# Patient Record
Sex: MALE | Race: WHITE | NOT HISPANIC OR LATINO | Employment: OTHER | URBAN - NONMETROPOLITAN AREA
[De-identification: names, ages, dates, MRNs, and addresses within clinical notes are randomized per-mention and may not be internally consistent; named-entity substitution may affect disease eponyms.]

---

## 2017-05-15 RX ORDER — ATORVASTATIN CALCIUM 10 MG/1
TABLET, FILM COATED ORAL
Qty: 30 TABLET | Refills: 1 | Status: SHIPPED | OUTPATIENT
Start: 2017-05-15 | End: 2018-04-05 | Stop reason: SINTOL

## 2018-04-05 ENCOUNTER — OFFICE VISIT (OUTPATIENT)
Dept: INTERNAL MEDICINE | Facility: CLINIC | Age: 58
End: 2018-04-05

## 2018-04-05 VITALS
TEMPERATURE: 98.1 F | SYSTOLIC BLOOD PRESSURE: 140 MMHG | HEART RATE: 67 BPM | HEIGHT: 70 IN | DIASTOLIC BLOOD PRESSURE: 73 MMHG | RESPIRATION RATE: 16 BRPM | WEIGHT: 158 LBS | OXYGEN SATURATION: 100 % | BODY MASS INDEX: 22.62 KG/M2

## 2018-04-05 DIAGNOSIS — Z82.49 FAMILY HISTORY OF MI (MYOCARDIAL INFARCTION): ICD-10-CM

## 2018-04-05 DIAGNOSIS — Z00.00 ANNUAL PHYSICAL EXAM: Primary | ICD-10-CM

## 2018-04-05 DIAGNOSIS — E78.2 MIXED HYPERLIPIDEMIA: ICD-10-CM

## 2018-04-05 DIAGNOSIS — R00.2 PALPITATIONS: ICD-10-CM

## 2018-04-05 PROCEDURE — 99214 OFFICE O/P EST MOD 30 MIN: CPT | Performed by: PHYSICIAN ASSISTANT

## 2018-04-05 NOTE — PROGRESS NOTES
Subjective     Chief Complaint: establish care, palpitations    HPI:   Saturnino Pereyra is a 58 y.o. male who presents to establish care.  Patient used to see Dr. Vargas.  He is currently on no medications.  Used to take Lipitor for hyperlipidemia, was stopped due to side effects.  Dietary and lifestyle modifications were recommended.    Today he complains of increasing episodes of palpitations.  Patient states he has had palpitations as well as cardiac workup many years ago, but feels episodes are becoming more frequent.  They're causing him anxiety.  He would like to see cardiology.  Denies chest pain, leg swelling, shortness of breath.    He also notes joint aches and pains over the past few months.  Occasionally pain to hips recently he has had pain in shoulders.  Sometimes feels like he has muscle weakness.  Always feeling tired.  No specific injury.  Not due to physical activity.     Patient also complains of a changing mole on lower right arm.  States is becoming more crusty appearing, may be changing in size.  Denies bleeding, pain, itching.  Has not seen dermatology.     History:  The following portions of the patient's history were reviewed and updated as appropriate:    Past Medical History:   Diagnosis Date   • Arthritis        No current outpatient prescriptions on file.    No Known Allergies    Past Surgical History:   Procedure Laterality Date   • BACK SURGERY Bilateral 03/1999   • EYE MUSCLE SURGERY Left 1967   • EYE MUSCLE SURGERY Bilateral 1963       Social History   Substance Use Topics   • Smoking status: Former Smoker     Types: Cigarettes     Quit date: 1980   • Smokeless tobacco: Never Used   • Alcohol use No       Family History   Problem Relation Age of Onset   • Heart disease Mother    • Atrial fibrillation Mother    • Hypotension Mother    • Arthritis Mother    • Heart disease Father        Review of Systems   Constitutional: Positive for fatigue. Negative for appetite change, chills, fever  "and unexpected weight change.   HENT: Negative for congestion, ear pain, hearing loss, nosebleeds, sinus pressure, sore throat, tinnitus and trouble swallowing.    Eyes: Negative for photophobia, discharge and visual disturbance.   Respiratory: Negative for cough, chest tightness, shortness of breath and wheezing.    Cardiovascular: Positive for palpitations. Negative for chest pain and leg swelling.   Gastrointestinal: Negative for abdominal distention, abdominal pain, blood in stool, constipation, diarrhea, nausea and vomiting.   Endocrine: Negative for cold intolerance, heat intolerance, polydipsia, polyphagia and polyuria.   Genitourinary: Negative for difficulty urinating, dysuria, flank pain, frequency, hematuria and urgency.   Musculoskeletal: Positive for arthralgias. Negative for back pain, joint swelling, myalgias, neck pain and neck stiffness.   Skin: Negative for color change, pallor, rash and wound.        Lesion on right wrist.   Allergic/Immunologic: Negative for environmental allergies, food allergies and immunocompromised state.   Neurological: Positive for weakness. Negative for dizziness, numbness and headaches.   Hematological: Negative for adenopathy. Does not bruise/bleed easily.   Psychiatric/Behavioral: Negative for dysphoric mood, hallucinations, sleep disturbance and suicidal ideas. The patient is not nervous/anxious.        Objective      Vitals:    04/05/18 1319   BP: 140/73   BP Location: Left arm   Patient Position: Sitting   Cuff Size: Adult   Pulse: 67   Resp: 16   Temp: 98.1 °F (36.7 °C)   TempSrc: Temporal Artery    SpO2: 100%   Weight: 71.7 kg (158 lb)   Height: 177.8 cm (70\")        Physical Exam   Constitutional: He is oriented to person, place, and time. He appears well-developed and well-nourished.   HENT:   Right Ear: Tympanic membrane normal.   Left Ear: Tympanic membrane normal.   Nose: Nose normal.   Mouth/Throat: Oropharynx is clear and moist.   Eyes: EOM are normal. " Pupils are equal, round, and reactive to light.   Neck: Normal range of motion. Neck supple.   Cardiovascular: Normal rate and regular rhythm.    Pulmonary/Chest: Effort normal and breath sounds normal.   Abdominal: Soft. Bowel sounds are normal. There is no CVA tenderness.   Musculoskeletal: Normal range of motion.   Lymphadenopathy:     He has no cervical adenopathy.   Neurological: He is alert and oriented to person, place, and time.   Skin: Skin is warm and dry.   Lesion appears to be seborrheic keratosis located near right wrist.   Psychiatric: He has a normal mood and affect.        Assessment/Plan     Diagnoses and all orders for this visit:    Annual physical exam  -     CBC (No Diff)  -     Comprehensive Metabolic Panel  -     Lipid Panel  -     Hemoglobin A1c  -     PSA SCREENING  -     Vitamin D 25 hydroxy  -     TSH    Palpitations  -     Ambulatory Referral to Cardiology  -     CBC (No Diff)  -     TSH    Declines EKG today.  Patient states he would like to see cardiology.  May consider Holter monitor.    Family history of MI (myocardial infarction)  -     Ambulatory Referral to Cardiology    Mixed hyperlipidemia  -     Ambulatory Referral to Cardiology  -     Lipid Panel    Discussed healthy diet and exercise, low salt, risks associated with hyperlipidemia.    Return in about 1 month (around 5/5/2018). w/ Dr. Vargas.    Ary Siddiqui PA-C  04/05/2018    Please note that portions of this note were completed with a voice recognition program. Efforts were made to edit dictation, but occasionally words are mistranscribed.

## 2018-04-09 LAB
25(OH)D3+25(OH)D2 SERPL-MCNC: 20.1 NG/ML
ALBUMIN SERPL-MCNC: 4.7 G/DL (ref 3.5–5)
ALBUMIN/GLOB SERPL: 1.4 G/DL (ref 1–2)
ALP SERPL-CCNC: 96 U/L (ref 38–126)
ALT SERPL-CCNC: 40 U/L (ref 13–69)
AST SERPL-CCNC: 30 U/L (ref 15–46)
BILIRUB SERPL-MCNC: 0.7 MG/DL (ref 0.2–1.3)
BUN SERPL-MCNC: 16 MG/DL (ref 7–20)
BUN/CREAT SERPL: 22.9 (ref 6.3–21.9)
CALCIUM SERPL-MCNC: 10.2 MG/DL (ref 8.4–10.2)
CHLORIDE SERPL-SCNC: 102 MMOL/L (ref 98–107)
CHOLEST SERPL-MCNC: 244 MG/DL (ref 0–199)
CO2 SERPL-SCNC: 31 MMOL/L (ref 26–30)
CREAT SERPL-MCNC: 0.7 MG/DL (ref 0.6–1.3)
ERYTHROCYTE [DISTWIDTH] IN BLOOD BY AUTOMATED COUNT: 12.8 % (ref 11.5–14.5)
GFR SERPLBLD CREATININE-BSD FMLA CKD-EPI: 115 ML/MIN/1.73
GFR SERPLBLD CREATININE-BSD FMLA CKD-EPI: 139 ML/MIN/1.73
GLOBULIN SER CALC-MCNC: 3.4 GM/DL
GLUCOSE SERPL-MCNC: 95 MG/DL (ref 74–98)
HBA1C MFR BLD: 5.7 %
HCT VFR BLD AUTO: 45.4 % (ref 42–52)
HDLC SERPL-MCNC: 47 MG/DL (ref 40–60)
HGB BLD-MCNC: 15.3 G/DL (ref 14–18)
LDLC SERPL CALC-MCNC: 155 MG/DL (ref 0–99)
MCH RBC QN AUTO: 32.1 PG (ref 27–31)
MCHC RBC AUTO-ENTMCNC: 33.7 G/DL (ref 30–37)
MCV RBC AUTO: 95.2 FL (ref 80–94)
PLATELET # BLD AUTO: 289 10*3/MM3 (ref 130–400)
POTASSIUM SERPL-SCNC: 4.9 MMOL/L (ref 3.5–5.1)
PROT SERPL-MCNC: 8.1 G/DL (ref 6.3–8.2)
PSA SERPL-MCNC: 2.12 NG/ML (ref 0.06–4)
RBC # BLD AUTO: 4.77 10*6/MM3 (ref 4.7–6.1)
SODIUM SERPL-SCNC: 144 MMOL/L (ref 137–145)
TRIGL SERPL-MCNC: 209 MG/DL
TSH SERPL DL<=0.005 MIU/L-ACNC: 0.84 MIU/ML (ref 0.47–4.68)
VLDLC SERPL CALC-MCNC: 41.8 MG/DL
WBC # BLD AUTO: 6.34 10*3/MM3 (ref 4.8–10.8)

## 2018-04-20 ENCOUNTER — CONSULT (OUTPATIENT)
Dept: CARDIOLOGY | Facility: CLINIC | Age: 58
End: 2018-04-20

## 2018-04-20 VITALS
BODY MASS INDEX: 22.48 KG/M2 | OXYGEN SATURATION: 96 % | WEIGHT: 157 LBS | HEIGHT: 70 IN | HEART RATE: 68 BPM | DIASTOLIC BLOOD PRESSURE: 78 MMHG | RESPIRATION RATE: 16 BRPM | SYSTOLIC BLOOD PRESSURE: 138 MMHG

## 2018-04-20 DIAGNOSIS — R07.2 PRECORDIAL PAIN: Primary | ICD-10-CM

## 2018-04-20 DIAGNOSIS — R94.39 ABNORMAL STRESS ECG WITH TREADMILL: ICD-10-CM

## 2018-04-20 DIAGNOSIS — R00.2 PALPITATIONS: ICD-10-CM

## 2018-04-20 PROCEDURE — 93000 ELECTROCARDIOGRAM COMPLETE: CPT | Performed by: INTERNAL MEDICINE

## 2018-04-20 PROCEDURE — 99203 OFFICE O/P NEW LOW 30 MIN: CPT | Performed by: INTERNAL MEDICINE

## 2018-04-20 NOTE — PROGRESS NOTES
"    Subjective:     Encounter Date:04/20/2018      Patient ID: Saturnino Pereyra is a 58 y.o. male.    Chief Complaint:Chest pain and palpitations  HPI  This is a 58-year-old male patient who reports having chest discomfort for \"all my life\".  The patient indicates for the last 3 weeks he has been experiencing a daily constant chest discomfort.  The patient indicates that the discomfort is diffusely across his upper central chest from just above his nipple line to just below the clavicles.  The discomfort is present continuously from the time he awakens until he goes to sleep at night.  The discomfort is worse with physical activities.  It does not radiate.  It has a 1/10 in intensity.  It is associated with weakness in the large muscle groups such as the buttocks eyes and shoulders.  There is no associated nausea vomiting diaphoresis or shortness of breath.  He has no shortness of breath at rest or with activity.  There is no orthopnea PND or lower extremity edema.  The patient reports having palpitations which have also been present throughout his adult life.  Over the last few weeks these have been worse.  He describes this as a sense that his heart is beating irregularly.  He feels as though there is a skipped beat followed by a very forceful beat.  This became nearly constant 2-3 weeks ago.  It seems to be worse with physical activity.  There is some associated dizziness but no syncope.  He reports generalized weakness and easy fatigue.  He has no history of hypertension or diabetes.  His cholesterol has been mildly elevated in the past but he has been intolerant to multiple statin based cholesterol-lowering therapy's due to myalgias.  He is a nonsmoker.  His family history is negative for premature coronary disease.  The following portions of the patient's history were reviewed and updated as appropriate: allergies, current medications, past family history, past medical history, past social history, past surgical " history and problem  Review of Systems   Constitution: Positive for weakness and malaise/fatigue. Negative for chills, diaphoresis, fever, weight gain and weight loss.   HENT: Negative for ear discharge, hearing loss, hoarse voice and nosebleeds.    Eyes: Negative for discharge, double vision, pain and photophobia.   Cardiovascular: Positive for chest pain, irregular heartbeat and palpitations. Negative for claudication, cyanosis, dyspnea on exertion, leg swelling, near-syncope, orthopnea, paroxysmal nocturnal dyspnea and syncope.   Respiratory: Negative for cough, hemoptysis, shortness of breath, sputum production and wheezing.    Endocrine: Negative for cold intolerance, heat intolerance, polydipsia, polyphagia and polyuria.   Hematologic/Lymphatic: Negative for adenopathy and bleeding problem. Does not bruise/bleed easily.   Skin: Negative for color change, flushing, itching and rash.   Musculoskeletal: Positive for muscle weakness. Negative for muscle cramps, myalgias and stiffness.   Gastrointestinal: Negative for abdominal pain, diarrhea, hematemesis, hematochezia, nausea and vomiting.   Genitourinary: Negative for dysuria, frequency and nocturia.   Neurological: Positive for dizziness. Negative for focal weakness, loss of balance, numbness, paresthesias and seizures.   Psychiatric/Behavioral: Negative for altered mental status, hallucinations and suicidal ideas.   Allergic/Immunologic: Negative for HIV exposure, hives and persistent infections.         No current outpatient prescriptions on file.     Objective:     Physical Exam   Constitutional: He is oriented to person, place, and time. He appears well-developed and well-nourished.   HENT:   Head: Normocephalic and atraumatic.   Mouth/Throat: Oropharynx is clear and moist.   Eyes: Conjunctivae and EOM are normal. Pupils are equal, round, and reactive to light. No scleral icterus.   Neck: Normal range of motion. Neck supple. No JVD present. No tracheal  "deviation present. No thyromegaly present.   Cardiovascular: Normal rate, regular rhythm, S1 normal, S2 normal, normal heart sounds, intact distal pulses and normal pulses.  PMI is not displaced.  Exam reveals no gallop and no friction rub.    No murmur heard.  Pulmonary/Chest: Effort normal and breath sounds normal. No respiratory distress. He has no wheezes. He has no rales.   Abdominal: Soft. Bowel sounds are normal. He exhibits no distension and no mass. There is no tenderness. There is no rebound and no guarding.   Musculoskeletal: Normal range of motion. He exhibits no edema or deformity.   Neurological: He is alert and oriented to person, place, and time. He displays normal reflexes. No cranial nerve deficit. Coordination normal.   Skin: Skin is warm and dry. No rash noted. No erythema.   Psychiatric: He has a normal mood and affect. His behavior is normal. Thought content normal.     Blood pressure 138/78, pulse 68, resp. rate 16, height 177.8 cm (70\"), weight 71.2 kg (157 lb), SpO2 96 %.   Lab Review:       Assessment:         1. Precordial pain  Atypical chest pain.  The patient has little in the way of risk factors for coronary artery disease.  It is been over 10 years since his last ischemic evaluation.  - Treadmill Stress Test  - Adult Transthoracic Echo Complete W/ Cont if Necessary Per Protocol    2. Palpitations  I suspect the patient is experiencing symptomatic PACs\PVCs.  He is never worn an outpatient heart monitor.  - Adult Transthoracic Echo Complete W/ Cont if Necessary Per Protocol  - Holter Monitor - 72 Hour Up To 21 Days      ECG 12 Lead  Date/Time: 4/20/2018 10:30 AM  Performed by: DESIREE DOSS  Authorized by: DESIREE DOSS   Rhythm: sinus rhythm  Rate: normal  QRS axis: normal  Clinical impression: normal ECG             Plan:       I have recommended a treadmill stress test, an echocardiogram and a Holter monitor.  No changes in his medication therapy have been made at today's " visit.  Further recommendations will be predicated on the results of his outpatient testing.

## 2018-05-07 ENCOUNTER — OFFICE VISIT (OUTPATIENT)
Dept: INTERNAL MEDICINE | Facility: CLINIC | Age: 58
End: 2018-05-07

## 2018-05-07 VITALS
HEIGHT: 70 IN | TEMPERATURE: 97.6 F | SYSTOLIC BLOOD PRESSURE: 100 MMHG | OXYGEN SATURATION: 99 % | BODY MASS INDEX: 22.05 KG/M2 | HEART RATE: 64 BPM | WEIGHT: 154 LBS | DIASTOLIC BLOOD PRESSURE: 70 MMHG

## 2018-05-07 DIAGNOSIS — M25.512 CHRONIC PAIN OF BOTH SHOULDERS: ICD-10-CM

## 2018-05-07 DIAGNOSIS — G89.29 CHRONIC PAIN OF BOTH SHOULDERS: ICD-10-CM

## 2018-05-07 DIAGNOSIS — M25.511 CHRONIC PAIN OF BOTH SHOULDERS: ICD-10-CM

## 2018-05-07 DIAGNOSIS — R00.2 PALPITATIONS: Primary | ICD-10-CM

## 2018-05-07 DIAGNOSIS — E78.2 MIXED HYPERLIPIDEMIA: ICD-10-CM

## 2018-05-07 LAB
CK SERPL-CCNC: 72 U/L (ref 30–170)
CRP SERPL-MCNC: <0.5 MG/DL (ref 0–1)

## 2018-05-07 PROCEDURE — 99214 OFFICE O/P EST MOD 30 MIN: CPT | Performed by: INTERNAL MEDICINE

## 2018-05-07 RX ORDER — ROSUVASTATIN CALCIUM 10 MG/1
10 TABLET, COATED ORAL DAILY
Qty: 30 TABLET | Refills: 5 | Status: SHIPPED | OUTPATIENT
Start: 2018-05-07 | End: 2018-11-06 | Stop reason: SDUPTHER

## 2018-05-07 NOTE — PROGRESS NOTES
"Subjective  Saturnino Pereyra is a 58 y.o. male    HPI coming in with complaints of bilateral shoulder pain in multiple joint pains and muscle aches without swelling or rash ongoing for a few months now has had episodes of palpitations for which she has been evaluated by cardiology at that time his EKG was unremarkable however he scheduled for a Holter monitoring with echocardiogram and stress test. Has had a history of dyslipidemia did not tolerate statins well in the past with severe myalgias    The following portions of the patient's history were reviewed and updated as appropriate: allergies, current medications, past family history, past medical history, past social history, past surgical history, and problem list.     Review of Systems   Constitutional: Negative.  Negative for activity change, appetite change, fatigue and fever.   HENT: Negative for congestion, ear discharge, ear pain and trouble swallowing.    Eyes: Negative for photophobia and visual disturbance.   Respiratory: Negative for cough and shortness of breath.    Cardiovascular: Negative for chest pain and palpitations.   Gastrointestinal: Negative for abdominal distention, abdominal pain, constipation, diarrhea, nausea and vomiting.   Endocrine: Negative.    Genitourinary: Negative for dysuria, hematuria and urgency.   Musculoskeletal: Positive for arthralgias and myalgias. Negative for back pain and joint swelling.   Skin: Negative for color change and rash.   Allergic/Immunologic: Negative.    Neurological: Negative for dizziness, weakness, light-headedness and headaches.   Hematological: Negative for adenopathy. Does not bruise/bleed easily.   Psychiatric/Behavioral: Negative for agitation, confusion and dysphoric mood. The patient is not nervous/anxious.        Visit Vitals  /70   Pulse 64   Temp 97.6 °F (36.4 °C)   Ht 177.8 cm (70\")   Wt 69.9 kg (154 lb)   SpO2 99%   BMI 22.10 kg/m²       Objective  Physical Exam   Constitutional: He is " oriented to person, place, and time. He appears well-developed and well-nourished. No distress.   HENT:   Nose: Nose normal.   Mouth/Throat: Oropharynx is clear and moist.   Eyes: Conjunctivae and EOM are normal. No scleral icterus.   Neck: No tracheal deviation present. No thyromegaly present.   Cardiovascular: Normal rate and regular rhythm.  Exam reveals no friction rub.    No murmur heard.  Pulmonary/Chest: No respiratory distress. He has no wheezes. He has no rales.   Abdominal: Soft. He exhibits no distension and no mass. There is no tenderness. There is no guarding.   Musculoskeletal: Normal range of motion. He exhibits no deformity.   Lymphadenopathy:     He has no cervical adenopathy.   Neurological: He is alert and oriented to person, place, and time. He has normal reflexes. No cranial nerve deficit. Coordination normal.   Skin: Skin is warm and dry. No rash noted. No erythema.   Psychiatric: He has a normal mood and affect. His behavior is normal. Judgment and thought content normal.       Diagnoses and all orders for this visit:    Palpitations currently exam unremarkable. Prior EKG reviewed unremarkable. Awaiting further workup denies chest pain or shortness of breath    Mixed hyperlipidemia. Elevated lipids noted from prior lab work through his work start Crestor at 10 mg daily to see if he can tolerate this better discussed dietary restrictions    Chronic pain of both shoulders with impingement syndrome demonstrated exercises with thera band. Referral to physical therapy if not better

## 2018-05-14 PROBLEM — R94.39 ABNORMAL STRESS ECG WITH TREADMILL: Status: ACTIVE | Noted: 2018-05-14

## 2018-05-14 LAB
BH CV ECHO MEAS - % IVS THICK: 66.7 %
BH CV ECHO MEAS - % LVPW THICK: 12.5 %
BH CV ECHO MEAS - AO MAX PG (FULL): 0.29 MMHG
BH CV ECHO MEAS - AO MAX PG: 4 MMHG
BH CV ECHO MEAS - AO MEAN PG (FULL): 0 MMHG
BH CV ECHO MEAS - AO MEAN PG: 2 MMHG
BH CV ECHO MEAS - AO ROOT AREA: 7.5 CM^2
BH CV ECHO MEAS - AO ROOT DIAM: 3.1 CM
BH CV ECHO MEAS - AO V2 MAX: 96.8 CM/SEC
BH CV ECHO MEAS - AO V2 MEAN: 65.2 CM/SEC
BH CV ECHO MEAS - AO V2 VTI: 16.7 CM
BH CV ECHO MEAS - AVA(I,A): 3.8 CM^2
BH CV ECHO MEAS - AVA(I,D): 3.8 CM^2
BH CV ECHO MEAS - AVA(V,A): 3.1 CM^2
BH CV ECHO MEAS - AVA(V,D): 3.1 CM^2
BH CV ECHO MEAS - CONTRAST EF 4CH: 66.4 ML/M^2
BH CV ECHO MEAS - EDV(CUBED): 117.6 ML
BH CV ECHO MEAS - EDV(MOD-SP4): 110 ML
BH CV ECHO MEAS - EDV(TEICH): 112.8 ML
BH CV ECHO MEAS - EF(CUBED): 69.5 %
BH CV ECHO MEAS - EF(MOD-SP4): 66.4 %
BH CV ECHO MEAS - EF(TEICH): 60.9 %
BH CV ECHO MEAS - ESV(CUBED): 35.9 ML
BH CV ECHO MEAS - ESV(MOD-SP4): 37 ML
BH CV ECHO MEAS - ESV(TEICH): 44.1 ML
BH CV ECHO MEAS - FS: 32.7 %
BH CV ECHO MEAS - IVS/LVPW: 1.1
BH CV ECHO MEAS - IVSD: 0.9 CM
BH CV ECHO MEAS - IVSS: 1.5 CM
BH CV ECHO MEAS - LA DIMENSION: 3.1 CM
BH CV ECHO MEAS - LA/AO: 1
BH CV ECHO MEAS - LV IVRT: 0.12 SEC
BH CV ECHO MEAS - LV MASS(C)D: 141.9 GRAMS
BH CV ECHO MEAS - LV MASS(C)S: 124.8 GRAMS
BH CV ECHO MEAS - LV MAX PG: 3.7 MMHG
BH CV ECHO MEAS - LV MEAN PG: 2 MMHG
BH CV ECHO MEAS - LV V1 MAX: 96.4 CM/SEC
BH CV ECHO MEAS - LV V1 MEAN: 66 CM/SEC
BH CV ECHO MEAS - LV V1 VTI: 20.2 CM
BH CV ECHO MEAS - LVIDD: 4.9 CM
BH CV ECHO MEAS - LVIDS: 3.3 CM
BH CV ECHO MEAS - LVLD AP4: 7.5 CM
BH CV ECHO MEAS - LVLS AP4: 6.1 CM
BH CV ECHO MEAS - LVOT AREA (M): 3.1 CM^2
BH CV ECHO MEAS - LVOT AREA: 3.1 CM^2
BH CV ECHO MEAS - LVOT DIAM: 2 CM
BH CV ECHO MEAS - LVPWD: 0.8 CM
BH CV ECHO MEAS - LVPWS: 0.9 CM
BH CV ECHO MEAS - MV A MAX VEL: 61.7 CM/SEC
BH CV ECHO MEAS - MV DEC SLOPE: 377 CM/SEC^2
BH CV ECHO MEAS - MV DEC TIME: 0.27 SEC
BH CV ECHO MEAS - MV E MAX VEL: 98.7 CM/SEC
BH CV ECHO MEAS - MV E/A: 1.6
BH CV ECHO MEAS - MV P1/2T MAX VEL: 96.7 CM/SEC
BH CV ECHO MEAS - MV P1/2T: 75.1 MSEC
BH CV ECHO MEAS - MVA P1/2T LCG: 2.3 CM^2
BH CV ECHO MEAS - MVA(P1/2T): 2.9 CM^2
BH CV ECHO MEAS - PA MAX PG: 3.8 MMHG
BH CV ECHO MEAS - PA V2 MAX: 97.2 CM/SEC
BH CV ECHO MEAS - RAP SYSTOLE: 10 MMHG
BH CV ECHO MEAS - RVSP: 21 MMHG
BH CV ECHO MEAS - SV(AO): 126 ML
BH CV ECHO MEAS - SV(CUBED): 81.7 ML
BH CV ECHO MEAS - SV(LVOT): 63.5 ML
BH CV ECHO MEAS - SV(MOD-SP4): 73 ML
BH CV ECHO MEAS - SV(TEICH): 68.7 ML
BH CV ECHO MEAS - TR MAX VEL: 168 CM/SEC
BH CV ECHO MEAS - TV MAX PG: 0.79 MMHG
BH CV ECHO MEAS - TV V2 MAX: 44.4 CM/SEC
BH CV STRESS DURATION MIN STAGE 1: 3
BH CV STRESS DURATION SEC STAGE 1: 0
BH CV STRESS GRADE STAGE 1: 10
BH CV STRESS METS STAGE 1: 5
BH CV STRESS PROTOCOL 1: ABNORMAL
BH CV STRESS RECOVERY BP: ABNORMAL MMHG
BH CV STRESS RECOVERY HR: 81 BPM
BH CV STRESS RECOVERY O2: 99 %
BH CV STRESS SPEED STAGE 1: 1.7
BH CV STRESS STAGE 1: 1
LV EF 2D ECHO EST: 60 %
MAXIMAL PREDICTED HEART RATE: 160 BPM
PERCENT MAX PREDICTED HR: 103.75 %
STRESS BASELINE BP: ABNORMAL MMHG
STRESS BASELINE HR: 66 BPM
STRESS O2 SAT REST: 100 %
STRESS PERCENT HR: 122 %
STRESS POST ESTIMATED WORKLOAD: 10.1 METS
STRESS POST EXERCISE DUR MIN: 9 MIN
STRESS POST O2 SAT PEAK: 98 %
STRESS POST PEAK BP: ABNORMAL MMHG
STRESS POST PEAK HR: 166 BPM
STRESS TARGET HR: 136 BPM

## 2018-05-16 ENCOUNTER — HOSPITAL ENCOUNTER (OUTPATIENT)
Facility: HOSPITAL | Age: 58
Setting detail: HOSPITAL OUTPATIENT SURGERY
Discharge: HOME | End: 2018-05-16
Attending: INTERNAL MEDICINE | Admitting: INTERNAL MEDICINE

## 2018-05-16 VITALS
TEMPERATURE: 97.2 F | SYSTOLIC BLOOD PRESSURE: 103 MMHG | OXYGEN SATURATION: 100 % | RESPIRATION RATE: 16 BRPM | HEART RATE: 54 BPM | BODY MASS INDEX: 21.62 KG/M2 | HEIGHT: 70 IN | DIASTOLIC BLOOD PRESSURE: 61 MMHG | WEIGHT: 151 LBS

## 2018-05-16 DIAGNOSIS — R94.39 ABNORMAL STRESS ECG WITH TREADMILL: ICD-10-CM

## 2018-05-16 DIAGNOSIS — R07.2 PRECORDIAL PAIN: ICD-10-CM

## 2018-05-16 LAB
ANION GAP SERPL CALCULATED.3IONS-SCNC: 14.9 MMOL/L (ref 10–20)
APTT PPP: 32.8 SECONDS (ref 25–36)
BASOPHILS # BLD AUTO: 0.04 10*3/MM3 (ref 0–0.2)
BASOPHILS NFR BLD AUTO: 0.6 % (ref 0–2.5)
BUN BLD-MCNC: 14 MG/DL (ref 7–20)
BUN/CREAT SERPL: 20 (ref 6.3–21.9)
CALCIUM SPEC-SCNC: 9.5 MG/DL (ref 8.4–10.2)
CHLORIDE SERPL-SCNC: 104 MMOL/L (ref 98–107)
CO2 SERPL-SCNC: 29 MMOL/L (ref 26–30)
CREAT BLD-MCNC: 0.7 MG/DL (ref 0.6–1.3)
DEPRECATED RDW RBC AUTO: 43.5 FL (ref 37–54)
EOSINOPHIL # BLD AUTO: 0.06 10*3/MM3 (ref 0–0.7)
EOSINOPHIL NFR BLD AUTO: 1 % (ref 0–7)
ERYTHROCYTE [DISTWIDTH] IN BLOOD BY AUTOMATED COUNT: 12.4 % (ref 11.5–14.5)
GFR SERPL CREATININE-BSD FRML MDRD: 115 ML/MIN/1.73
GLUCOSE BLD-MCNC: 100 MG/DL (ref 74–98)
HCT VFR BLD AUTO: 41.1 % (ref 42–52)
HGB BLD-MCNC: 14.3 G/DL (ref 14–18)
IMM GRANULOCYTES # BLD: 0.02 10*3/MM3 (ref 0–0.06)
IMM GRANULOCYTES NFR BLD: 0.3 % (ref 0–0.6)
INR PPP: 1.08 (ref 0.9–1.1)
LYMPHOCYTES # BLD AUTO: 2.08 10*3/MM3 (ref 0.6–3.4)
LYMPHOCYTES NFR BLD AUTO: 33.2 % (ref 10–50)
MCH RBC QN AUTO: 32.6 PG (ref 27–31)
MCHC RBC AUTO-ENTMCNC: 34.8 G/DL (ref 30–37)
MCV RBC AUTO: 93.8 FL (ref 80–94)
MONOCYTES # BLD AUTO: 0.65 10*3/MM3 (ref 0–0.9)
MONOCYTES NFR BLD AUTO: 10.4 % (ref 0–12)
NEUTROPHILS # BLD AUTO: 3.42 10*3/MM3 (ref 2–6.9)
NEUTROPHILS NFR BLD AUTO: 54.5 % (ref 37–80)
NRBC BLD MANUAL-RTO: 0 /100 WBC (ref 0–0)
PLATELET # BLD AUTO: 247 10*3/MM3 (ref 130–400)
PMV BLD AUTO: 10.1 FL (ref 6–12)
POTASSIUM BLD-SCNC: 3.9 MMOL/L (ref 3.5–5.1)
PROTHROMBIN TIME: 12.1 SECONDS (ref 9.3–12.1)
RBC # BLD AUTO: 4.38 10*6/MM3 (ref 4.7–6.1)
SODIUM BLD-SCNC: 144 MMOL/L (ref 137–145)
WBC NRBC COR # BLD: 6.27 10*3/MM3 (ref 4.8–10.8)

## 2018-05-16 PROCEDURE — 85730 THROMBOPLASTIN TIME PARTIAL: CPT | Performed by: INTERNAL MEDICINE

## 2018-05-16 PROCEDURE — C1769 GUIDE WIRE: HCPCS | Performed by: INTERNAL MEDICINE

## 2018-05-16 PROCEDURE — 25010000002 MIDAZOLAM PER 1 MG: Performed by: INTERNAL MEDICINE

## 2018-05-16 PROCEDURE — 93454 CORONARY ARTERY ANGIO S&I: CPT | Performed by: INTERNAL MEDICINE

## 2018-05-16 PROCEDURE — 85610 PROTHROMBIN TIME: CPT | Performed by: INTERNAL MEDICINE

## 2018-05-16 PROCEDURE — 25010000002 HEPARIN (PORCINE) PER 1000 UNITS: Performed by: INTERNAL MEDICINE

## 2018-05-16 PROCEDURE — 25010000002 FENTANYL CITRATE (PF) 100 MCG/2ML SOLUTION: Performed by: INTERNAL MEDICINE

## 2018-05-16 PROCEDURE — 85025 COMPLETE CBC W/AUTO DIFF WBC: CPT | Performed by: INTERNAL MEDICINE

## 2018-05-16 PROCEDURE — C1894 INTRO/SHEATH, NON-LASER: HCPCS | Performed by: INTERNAL MEDICINE

## 2018-05-16 PROCEDURE — 93005 ELECTROCARDIOGRAM TRACING: CPT | Performed by: INTERNAL MEDICINE

## 2018-05-16 PROCEDURE — 80048 BASIC METABOLIC PNL TOTAL CA: CPT | Performed by: INTERNAL MEDICINE

## 2018-05-16 PROCEDURE — 0 IOPAMIDOL PER 1 ML: Performed by: INTERNAL MEDICINE

## 2018-05-16 RX ORDER — MIDAZOLAM HYDROCHLORIDE 1 MG/ML
INJECTION INTRAMUSCULAR; INTRAVENOUS AS NEEDED
Status: DISCONTINUED | OUTPATIENT
Start: 2018-05-16 | End: 2018-05-16 | Stop reason: HOSPADM

## 2018-05-16 RX ORDER — SODIUM CHLORIDE 9 MG/ML
100 INJECTION, SOLUTION INTRAVENOUS CONTINUOUS
Status: DISCONTINUED | OUTPATIENT
Start: 2018-05-16 | End: 2018-05-16 | Stop reason: HOSPADM

## 2018-05-16 RX ORDER — LIDOCAINE HYDROCHLORIDE 10 MG/ML
INJECTION, SOLUTION INFILTRATION; PERINEURAL AS NEEDED
Status: DISCONTINUED | OUTPATIENT
Start: 2018-05-16 | End: 2018-05-16 | Stop reason: HOSPADM

## 2018-05-16 RX ORDER — FENTANYL CITRATE 50 UG/ML
INJECTION, SOLUTION INTRAMUSCULAR; INTRAVENOUS AS NEEDED
Status: DISCONTINUED | OUTPATIENT
Start: 2018-05-16 | End: 2018-05-16 | Stop reason: HOSPADM

## 2018-05-16 NOTE — INTERVAL H&P NOTE
H&P reviewed. The patient was examined and there are no changes to the H&P.      Addendum  Physical examination  Ear  Nose and throat: Normal gag reflex  Neck: Normal central venous pressure  Peripheral pulses: Left and right radial pulses are present with normal Harjinder testing  Lungs: Clear to auscultation  Heart: Regular rate and rhythm.  Normal S1.  Normal S2.  No murmurs rubs or gallops.

## 2018-06-04 ENCOUNTER — OFFICE VISIT (OUTPATIENT)
Dept: CARDIOLOGY | Facility: CLINIC | Age: 58
End: 2018-06-04

## 2018-06-04 VITALS
HEART RATE: 78 BPM | OXYGEN SATURATION: 98 % | WEIGHT: 151 LBS | BODY MASS INDEX: 21.62 KG/M2 | SYSTOLIC BLOOD PRESSURE: 118 MMHG | RESPIRATION RATE: 18 BRPM | DIASTOLIC BLOOD PRESSURE: 70 MMHG | HEIGHT: 70 IN

## 2018-06-04 DIAGNOSIS — R00.2 PALPITATIONS: ICD-10-CM

## 2018-06-04 DIAGNOSIS — R07.2 PRECORDIAL PAIN: Primary | ICD-10-CM

## 2018-06-04 PROCEDURE — 99213 OFFICE O/P EST LOW 20 MIN: CPT | Performed by: INTERNAL MEDICINE

## 2018-06-04 NOTE — PROGRESS NOTES
"    Subjective:     Encounter Date:06/04/2018      Patient ID: Saturnino Pereyra is a 58 y.o. male.    Chief Complaint:Palpitations  HPI  This is a 58-year-old male patient who recently underwent a battery of outpatient testing.  The patient had an outpatient cardiac catheterization which disclosed angiographically near normal coronary arteries.  The patient had very minor plaque accumulation in a branch of an obtuse marginal branch.  His main coronary arteries including the left main, left anterior descending, circumflex and right coronary arteries had no significant disease.  The patient also underwent an echocardiogram which showed normal cardiac chamber dimensions and muscle thickness.  The ejection fraction was normal and there were no regional wall motion abnormalities.  The patient demonstrated mild mitral regurgitation of no clinical significance.  There was no evidence of significant valvular pathology, pulmonary hypertension, intracardiac shunting or pericardial disease.  Both systolic and diastolic function was normal.  The patient also had a seven-day cardiac monitor in which she had 10 symptomatic activations for palpitations over the monitoring phase.  In each and every case the patient was experiencing only normal sinus rhythm.  He experienced no atrial or ventricular ectopy.  The patient's palpitations occurred both while at rest and with activity.  In each case the patient's heart rate was in the normal range from 67 bpm to 100 bpm.  The patient indicates that he uses a stethoscope at night when he is laying in bed and is absolutely sure that his heart was \"skipping beats\" during some of these episodes.  The patient indicates that he has been experiencing palpitations now for over 20 years but they are much worse in frequency duration and intensity.  The patient indicates that on the suggestion of his primary care provider he initiated over-the-counter proton pump inhibitor therapy and after 3 doses his " "chest discomfort completely resolved.  The patient is concerned that his chest discomfort would be relieved with antacid therapy.  I have explained to the patient that esophageal discomfort is very commonly mistaken for cardiac abnormalities.  This is in essence the rosaline of the symptom main \"heartburn\".  I have also explained to the patient that it is common for other patients who have both significant cardiac disease as well as esophageal disorders that they cannot distinguish their symptoms.  The patient is encouraged that the resolution of his chest discomfort with antacid therapy is a positive feature.  The patient is however concerned that the antacid therapy has not improved his palpitations.  I do not feel there is a connection between his esophageal pathology and his palpitations.  I have reassured the patient that there is no evidence of arrhythmia or frequent\complex atrial or ventricular ectopy.  More importantly.  There is absolutely no correlation between his palpitations and any documented arrhythmia or ectopy.  I have no explanation for the etiology of his symptom of palpitations.  The following portions of the patient's history were reviewed and updated as appropriate: allergies, current medications, past family history, past medical history, past social history, past surgical history and problem  Review of Systems   Constitution: Negative for chills, diaphoresis, fever, weakness, malaise/fatigue, night sweats, weight gain and weight loss.   HENT: Negative for ear discharge, hearing loss, hoarse voice and nosebleeds.    Eyes: Negative for discharge, double vision, pain and photophobia.   Cardiovascular: Positive for irregular heartbeat and palpitations. Negative for chest pain, claudication, cyanosis, dyspnea on exertion, leg swelling, near-syncope, orthopnea, paroxysmal nocturnal dyspnea and syncope.   Respiratory: Negative for cough, hemoptysis, shortness of breath, sputum production and " wheezing.    Endocrine: Negative for cold intolerance, heat intolerance, polydipsia, polyphagia and polyuria.   Hematologic/Lymphatic: Negative for adenopathy and bleeding problem. Does not bruise/bleed easily.   Skin: Negative for color change, flushing, itching and rash.   Musculoskeletal: Negative for muscle cramps, muscle weakness, myalgias and stiffness.   Gastrointestinal: Negative for abdominal pain, diarrhea, hematemesis, hematochezia, nausea and vomiting.   Genitourinary: Negative for dysuria, frequency and nocturia.   Neurological: Negative for focal weakness, loss of balance, numbness, paresthesias and seizures.   Psychiatric/Behavioral: Negative for altered mental status, hallucinations and suicidal ideas.   Allergic/Immunologic: Negative for HIV exposure, hives and persistent infections.           Current Outpatient Prescriptions:   •  rosuvastatin (CRESTOR) 10 MG tablet, Take 1 tablet by mouth Daily., Disp: 30 tablet, Rfl: 5     Objective:     Physical Exam   Constitutional: He is oriented to person, place, and time. He appears well-developed and well-nourished.   HENT:   Head: Normocephalic and atraumatic.   Mouth/Throat: Oropharynx is clear and moist.   Eyes: Conjunctivae and EOM are normal. Pupils are equal, round, and reactive to light. No scleral icterus.   Neck: Normal range of motion. Neck supple. No JVD present. No tracheal deviation present. No thyromegaly present.   Cardiovascular: Normal rate, regular rhythm, S1 normal, S2 normal, normal heart sounds, intact distal pulses and normal pulses.  PMI is not displaced.  Exam reveals no gallop and no friction rub.    No murmur heard.  Pulmonary/Chest: Effort normal and breath sounds normal. No respiratory distress. He has no wheezes. He has no rales.   Abdominal: Soft. Bowel sounds are normal. He exhibits no distension and no mass. There is no tenderness. There is no rebound and no guarding.   Musculoskeletal: Normal range of motion. He exhibits no  "edema or deformity.   Neurological: He is alert and oriented to person, place, and time. He displays normal reflexes. No cranial nerve deficit. Coordination normal.   Skin: Skin is warm and dry. No rash noted. No erythema.   Psychiatric: He has a normal mood and affect. His behavior is normal. Thought content normal.     Blood pressure 118/70, pulse 78, resp. rate 18, height 177.8 cm (70\"), weight 68.5 kg (151 lb), SpO2 98 %.   Lab Review:       Assessment:         1. Precordial pain  Noncardiac chest pain.  In retrospect it would appear that the patient is experiencing esophageal chest discomfort.  Patient indicates that his chest discomfort has completely resolved after 3 doses of over-the-counter Prilosec.    2. Palpitations  The patient continues to have palpitations.  He reports this as a sense that his heart has \"skipped a beat\".  The patient has worn a seven-day cardiac monitor.  During 7 days the patient experienced 10 diary entries all 4 palpitations.  In each and every case the patient experienced normal rhythm with no evidence of arrhythmia atrial or ventricular ectopy.    Procedures     Plan:       No additional cardiovascular testing is indicated from my standpoint.  No changes to his medication therapy are indicated.  The patient indicates that he is disappointed in the negative results of his cardiac testing.  I have reassured the patient regarding the benign nature of his cardiac findings.  The patient indicates that he has been dealing with palpitations for over 20 years and he indicates that he is convinced that there is \"something going on\".  I have encouraged the patient regarding his benign test results.  I have indicated that the patient should take great encouragement from the results of this testing.  I have offered the patient the option of a second opinion from a cardiologist in [facility].  The patient indicates that he will discuss this further with his primary care " provider.

## 2018-06-06 ENCOUNTER — HOSPITAL ENCOUNTER (EMERGENCY)
Facility: HOSPITAL | Age: 58
Discharge: HOME | End: 2018-06-06
Attending: EMERGENCY MEDICINE | Admitting: EMERGENCY MEDICINE

## 2018-06-06 ENCOUNTER — APPOINTMENT (OUTPATIENT)
Dept: CT IMAGING | Facility: HOSPITAL | Age: 58
End: 2018-06-06

## 2018-06-06 VITALS
DIASTOLIC BLOOD PRESSURE: 76 MMHG | OXYGEN SATURATION: 98 % | HEIGHT: 70 IN | TEMPERATURE: 98.5 F | RESPIRATION RATE: 18 BRPM | BODY MASS INDEX: 21.79 KG/M2 | HEART RATE: 61 BPM | SYSTOLIC BLOOD PRESSURE: 139 MMHG | WEIGHT: 152.2 LBS

## 2018-06-06 DIAGNOSIS — N20.1 RIGHT URETERAL STONE: Primary | ICD-10-CM

## 2018-06-06 LAB
ALBUMIN SERPL-MCNC: 4.8 G/DL (ref 3.5–5)
ALBUMIN/GLOB SERPL: 1.4 G/DL (ref 1–2)
ALP SERPL-CCNC: 94 U/L (ref 38–126)
ALT SERPL W P-5'-P-CCNC: 32 U/L (ref 13–69)
ANION GAP SERPL CALCULATED.3IONS-SCNC: 17.4 MMOL/L (ref 10–20)
AST SERPL-CCNC: 32 U/L (ref 15–46)
BACTERIA UR QL AUTO: ABNORMAL /HPF
BASOPHILS # BLD AUTO: 0.03 10*3/MM3 (ref 0–0.2)
BASOPHILS NFR BLD AUTO: 0.2 % (ref 0–2.5)
BILIRUB SERPL-MCNC: 0.5 MG/DL (ref 0.2–1.3)
BILIRUB UR QL STRIP: NEGATIVE
BUN BLD-MCNC: 16 MG/DL (ref 7–20)
BUN/CREAT SERPL: 20 (ref 6.3–21.9)
CALCIUM SPEC-SCNC: 10.1 MG/DL (ref 8.4–10.2)
CHLORIDE SERPL-SCNC: 103 MMOL/L (ref 98–107)
CLARITY UR: CLEAR
CO2 SERPL-SCNC: 27 MMOL/L (ref 26–30)
COLOR UR: YELLOW
CREAT BLD-MCNC: 0.8 MG/DL (ref 0.6–1.3)
D-LACTATE SERPL-SCNC: 1.2 MMOL/L (ref 0.5–2)
DEPRECATED RDW RBC AUTO: 43 FL (ref 37–54)
EOSINOPHIL # BLD AUTO: 0.01 10*3/MM3 (ref 0–0.7)
EOSINOPHIL NFR BLD AUTO: 0.1 % (ref 0–7)
ERYTHROCYTE [DISTWIDTH] IN BLOOD BY AUTOMATED COUNT: 12.4 % (ref 11.5–14.5)
GFR SERPL CREATININE-BSD FRML MDRD: 99 ML/MIN/1.73
GLOBULIN UR ELPH-MCNC: 3.4 GM/DL
GLUCOSE BLD-MCNC: 117 MG/DL (ref 74–98)
GLUCOSE UR STRIP-MCNC: NEGATIVE MG/DL
HCT VFR BLD AUTO: 41.7 % (ref 42–52)
HGB BLD-MCNC: 14.2 G/DL (ref 14–18)
HGB UR QL STRIP.AUTO: ABNORMAL
HOLD SPECIMEN: NORMAL
HOLD SPECIMEN: NORMAL
HYALINE CASTS UR QL AUTO: ABNORMAL /LPF
IMM GRANULOCYTES # BLD: 0.04 10*3/MM3 (ref 0–0.06)
IMM GRANULOCYTES NFR BLD: 0.3 % (ref 0–0.6)
KETONES UR QL STRIP: NEGATIVE
LEUKOCYTE ESTERASE UR QL STRIP.AUTO: NEGATIVE
LIPASE SERPL-CCNC: 88 U/L (ref 23–300)
LYMPHOCYTES # BLD AUTO: 1.37 10*3/MM3 (ref 0.6–3.4)
LYMPHOCYTES NFR BLD AUTO: 10.4 % (ref 10–50)
MCH RBC QN AUTO: 32.1 PG (ref 27–31)
MCHC RBC AUTO-ENTMCNC: 34.1 G/DL (ref 30–37)
MCV RBC AUTO: 94.3 FL (ref 80–94)
MONOCYTES # BLD AUTO: 0.85 10*3/MM3 (ref 0–0.9)
MONOCYTES NFR BLD AUTO: 6.4 % (ref 0–12)
NEUTROPHILS # BLD AUTO: 10.91 10*3/MM3 (ref 2–6.9)
NEUTROPHILS NFR BLD AUTO: 82.6 % (ref 37–80)
NITRITE UR QL STRIP: NEGATIVE
NRBC BLD MANUAL-RTO: 0 /100 WBC (ref 0–0)
PH UR STRIP.AUTO: 8.5 [PH] (ref 5–8)
PLATELET # BLD AUTO: 274 10*3/MM3 (ref 130–400)
PMV BLD AUTO: 10.6 FL (ref 6–12)
POTASSIUM BLD-SCNC: 4.4 MMOL/L (ref 3.5–5.1)
PROT SERPL-MCNC: 8.2 G/DL (ref 6.3–8.2)
PROT UR QL STRIP: NEGATIVE
RBC # BLD AUTO: 4.42 10*6/MM3 (ref 4.7–6.1)
RBC # UR: ABNORMAL /HPF
REF LAB TEST METHOD: ABNORMAL
SODIUM BLD-SCNC: 143 MMOL/L (ref 137–145)
SP GR UR STRIP: 1.01 (ref 1–1.03)
SQUAMOUS #/AREA URNS HPF: ABNORMAL /HPF
UROBILINOGEN UR QL STRIP: ABNORMAL
WBC NRBC COR # BLD: 13.21 10*3/MM3 (ref 4.8–10.8)
WBC UR QL AUTO: ABNORMAL /HPF
WHOLE BLOOD HOLD SPECIMEN: NORMAL
WHOLE BLOOD HOLD SPECIMEN: NORMAL

## 2018-06-06 PROCEDURE — 96374 THER/PROPH/DIAG INJ IV PUSH: CPT

## 2018-06-06 PROCEDURE — 83605 ASSAY OF LACTIC ACID: CPT | Performed by: EMERGENCY MEDICINE

## 2018-06-06 PROCEDURE — 25010000002 ONDANSETRON PER 1 MG: Performed by: EMERGENCY MEDICINE

## 2018-06-06 PROCEDURE — 25010000002 MORPHINE PER 10 MG: Performed by: EMERGENCY MEDICINE

## 2018-06-06 PROCEDURE — 83690 ASSAY OF LIPASE: CPT | Performed by: EMERGENCY MEDICINE

## 2018-06-06 PROCEDURE — 96375 TX/PRO/DX INJ NEW DRUG ADDON: CPT

## 2018-06-06 PROCEDURE — 80053 COMPREHEN METABOLIC PANEL: CPT | Performed by: EMERGENCY MEDICINE

## 2018-06-06 PROCEDURE — 99284 EMERGENCY DEPT VISIT MOD MDM: CPT

## 2018-06-06 PROCEDURE — 74176 CT ABD & PELVIS W/O CONTRAST: CPT

## 2018-06-06 PROCEDURE — 85025 COMPLETE CBC W/AUTO DIFF WBC: CPT | Performed by: EMERGENCY MEDICINE

## 2018-06-06 PROCEDURE — 81001 URINALYSIS AUTO W/SCOPE: CPT | Performed by: EMERGENCY MEDICINE

## 2018-06-06 PROCEDURE — 25010000002 KETOROLAC TROMETHAMINE PER 15 MG: Performed by: EMERGENCY MEDICINE

## 2018-06-06 RX ORDER — ONDANSETRON 2 MG/ML
4 INJECTION INTRAMUSCULAR; INTRAVENOUS ONCE
Status: COMPLETED | OUTPATIENT
Start: 2018-06-06 | End: 2018-06-06

## 2018-06-06 RX ORDER — ONDANSETRON 4 MG/1
4 TABLET, ORALLY DISINTEGRATING ORAL EVERY 8 HOURS PRN
Qty: 8 TABLET | Refills: 0 | Status: SHIPPED | OUTPATIENT
Start: 2018-06-06 | End: 2019-04-11

## 2018-06-06 RX ORDER — SODIUM CHLORIDE 0.9 % (FLUSH) 0.9 %
10 SYRINGE (ML) INJECTION AS NEEDED
Status: DISCONTINUED | OUTPATIENT
Start: 2018-06-06 | End: 2018-06-06 | Stop reason: HOSPADM

## 2018-06-06 RX ORDER — OXYCODONE HYDROCHLORIDE AND ACETAMINOPHEN 5; 325 MG/1; MG/1
1 TABLET ORAL EVERY 6 HOURS PRN
Qty: 12 TABLET | Refills: 0 | Status: SHIPPED | OUTPATIENT
Start: 2018-06-06 | End: 2019-04-11

## 2018-06-06 RX ORDER — TAMSULOSIN HYDROCHLORIDE 0.4 MG/1
1 CAPSULE ORAL NIGHTLY
Qty: 10 CAPSULE | Refills: 0 | Status: SHIPPED | OUTPATIENT
Start: 2018-06-06 | End: 2018-06-18 | Stop reason: SDUPTHER

## 2018-06-06 RX ORDER — MORPHINE SULFATE 2 MG/ML
4 INJECTION, SOLUTION INTRAMUSCULAR; INTRAVENOUS ONCE
Status: COMPLETED | OUTPATIENT
Start: 2018-06-06 | End: 2018-06-06

## 2018-06-06 RX ORDER — KETOROLAC TROMETHAMINE 30 MG/ML
15 INJECTION, SOLUTION INTRAMUSCULAR; INTRAVENOUS ONCE
Status: COMPLETED | OUTPATIENT
Start: 2018-06-06 | End: 2018-06-06

## 2018-06-06 RX ADMIN — Medication 10 ML: at 13:24

## 2018-06-06 RX ADMIN — MORPHINE SULFATE 4 MG: 2 INJECTION, SOLUTION INTRAMUSCULAR; INTRAVENOUS at 13:24

## 2018-06-06 RX ADMIN — ONDANSETRON 4 MG: 2 INJECTION, SOLUTION INTRAMUSCULAR; INTRAVENOUS at 13:26

## 2018-06-06 RX ADMIN — KETOROLAC TROMETHAMINE 15 MG: 30 INJECTION, SOLUTION INTRAMUSCULAR; INTRAVENOUS at 13:26

## 2018-06-06 NOTE — DISCHARGE INSTRUCTIONS
Please take all medication as prescribed.  Call the urologist today to arrange outpatient follow-up.  Return to the ER anytime if your symptoms change or worsen significantly.

## 2018-06-06 NOTE — ED PROVIDER NOTES
Subjective   58-year-old male here today complaining of moderate severe right-sided abdominal pain awakened him about 5:30 AM today.    He had the same pain several days ago that started while he was driving his car.  The pain was right testicle radiating up into the right abdomen and lasted about 30-45 minutes.  He had some nausea with it the other day but no other symptoms.  The pain resolved and did not return until this morning, 2 days later.  The pain awakened him today it is sharp and comes and goes.  At times it is severe and then at other times barely noticeable and it is even resolved.  Today the pain seems to radiate from the right testicle into the right abdomen as well as the right flank area.  Last bowel movement was 2 days ago and reportedly normal.  He had some nausea and vomiting today once and said he dry heaved after he got here.  No swelling or discoloration of the scrotum-testicles.  No urethral discharge.  No hematuria.  No history of kidney stones.  He has no chest pain or shortness of air.  No rash.  The pain seemed to be eased by pulling his knees up to his chest.  The pain is not worsened by anything specific.  Currently rates his pain is about a 6 out of 10            Review of Systems    Past Medical History:   Diagnosis Date   • Arthritis    • Heart murmur    • Hyperlipidemia        Allergies   Allergen Reactions   • Lipitor [Atorvastatin] Myalgia       Past Surgical History:   Procedure Laterality Date   • BACK SURGERY Bilateral 03/1999   • CARDIAC CATHETERIZATION N/A 5/16/2018    Procedure: Coronary angiography;  Surgeon: Peter Fragoso MD;  Location: [facility];  Service: Cardiovascular   • EYE MUSCLE SURGERY Left 1967   • EYE MUSCLE SURGERY Bilateral 1963       Family History   Problem Relation Age of Onset   • Heart disease Mother    • Atrial fibrillation Mother    • Hypotension Mother    • Arthritis Mother    • Arrhythmia Mother         afib on Tikosyn   •  Heart disease Father    • Heart attack Paternal Grandfather        Social History     Social History   • Marital status:      Social History Main Topics   • Smoking status: Former Smoker     Types: Cigarettes     Quit date: 1980   • Smokeless tobacco: Never Used   • Alcohol use No   • Drug use: No     Other Topics Concern   • Not on file           Objective   Physical Exam   Constitutional: He appears well-developed and well-nourished. No distress.   Pleasant male who does not appear to be in any significant pain and doesn't appear acutely ill   HENT:   Head: Normocephalic and atraumatic.   Nose: Nose normal.   Mouth/Throat: Oropharynx is clear and moist.   Eyes: EOM are normal. No scleral icterus.   Neck: Normal range of motion. No tracheal deviation present. No thyromegaly present.   Cardiovascular: Normal rate, regular rhythm, normal heart sounds and intact distal pulses.    Pulmonary/Chest: Effort normal.   Abdominal: Soft. Bowel sounds are normal. He exhibits no distension and no mass. There is tenderness. There is no rebound and no guarding. No hernia.   Very minimal right lower quadrant tenderness below McBurney's no guarding or rebound no pulsatile Mass. no audible bruit   Musculoskeletal: Normal range of motion. He exhibits no edema or deformity.   Neurological: He is alert. No cranial nerve deficit or sensory deficit. He exhibits normal muscle tone. Coordination normal.   Skin: Skin is warm. Capillary refill takes less than 2 seconds. No rash noted. No pallor.   Psychiatric: He has a normal mood and affect. His behavior is normal. Thought content normal.   Vitals reviewed.      Procedures           ED Course  ED Course as of Jun 06 1448   Wed Jun 06, 2018   1423 CT confirms a 4 mm right mid ureteral stone.  He has a mild leukocytosis with a white count of 13 hemoglobin is normal.  CMP is unremarkable and urinalysis shows no infection.  Lipase is normal at 88.  I'm going to treat him as an outpatient  "and refer him to urology.  [BW]   1442 Reexamination about 1430 pain completely resolved.  He sitting up in bed \"I feel like a new man\" discussed CT findings lab findings and the need to call urology later today to make an appointment.  I'm going to send him home with a urine strainer, Percocet, Flomax ×7 days as well as Zofran.  Discussed reasons that would require immediate return to the emergency department as well.  Patient and wife had no further questions.  I did tell him he should not drive while taking the Percocet, operate any type of heavy equipment, lawnmowers etc. he understands  [BW]      ED Course User Index  [BW] Ry Christian PA-C                  [facility]      Final diagnoses:   Right ureteral stone            Ry Christian PA-C  06/06/18 1448    "

## 2018-06-11 ENCOUNTER — HOSPITAL ENCOUNTER (OUTPATIENT)
Dept: GENERAL RADIOLOGY | Facility: HOSPITAL | Age: 58
Discharge: HOME | End: 2018-06-11
Attending: UROLOGY | Admitting: UROLOGY

## 2018-06-11 ENCOUNTER — OFFICE VISIT (OUTPATIENT)
Dept: UROLOGY | Facility: CLINIC | Age: 58
End: 2018-06-11

## 2018-06-11 VITALS
WEIGHT: 152 LBS | DIASTOLIC BLOOD PRESSURE: 78 MMHG | HEIGHT: 70 IN | TEMPERATURE: 98.4 F | BODY MASS INDEX: 21.76 KG/M2 | OXYGEN SATURATION: 98 % | HEART RATE: 80 BPM | SYSTOLIC BLOOD PRESSURE: 110 MMHG

## 2018-06-11 DIAGNOSIS — N20.0 KIDNEY STONE: Primary | ICD-10-CM

## 2018-06-11 LAB
BILIRUB BLD-MCNC: NEGATIVE MG/DL
CLARITY, POC: CLEAR
COLOR UR: YELLOW
GLUCOSE UR STRIP-MCNC: NEGATIVE MG/DL
KETONES UR QL: NEGATIVE
LEUKOCYTE EST, POC: NEGATIVE
NITRITE UR-MCNC: NEGATIVE MG/ML
PH UR: 6 [PH] (ref 5–8)
PROT UR STRIP-MCNC: NEGATIVE MG/DL
RBC # UR STRIP: ABNORMAL /UL
SP GR UR: 1.02 (ref 1–1.03)
UROBILINOGEN UR QL: NORMAL

## 2018-06-11 PROCEDURE — 74018 RADEX ABDOMEN 1 VIEW: CPT

## 2018-06-11 PROCEDURE — 81003 URINALYSIS AUTO W/O SCOPE: CPT | Performed by: UROLOGY

## 2018-06-11 PROCEDURE — 99203 OFFICE O/P NEW LOW 30 MIN: CPT | Performed by: UROLOGY

## 2018-06-11 NOTE — PROGRESS NOTES
Chief Complaint  Nephrolithiasis (4mm stone)        BASSEM Pereyra is a 58 y.o. male who is referred for follow-up after recent emergency room visit for right sided testicular abdominal pain.  He was found have a 4 mm calculus in his right proximal ureter.  He returns today still having some discomfort but not as bad as before.  The KUB today suggest the stone is artery passed down into the pelvis.  He was given Flomax but isn't sure why he is taking a prostate medicine and this is explained.  He's never had a stone before and his family history is negative.  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  He states he eats very little red meat.    Vitals:    06/11/18 1613   BP: 110/78   Pulse: 80   Temp: 98.4 °F (36.9 °C)   SpO2: 98%       Past Medical History  Past Medical History:   Diagnosis Date   • Arthritis    • Heart murmur    • Hyperlipidemia        Past Surgical History  Past Surgical History:   Procedure Laterality Date   • BACK SURGERY Bilateral 03/1999   • CARDIAC CATHETERIZATION N/A 5/16/2018    Procedure: Coronary angiography;  Surgeon: Peter Fragoso MD;  Location: [facility] CATH INVASIVE LOCATION;  Service: Cardiovascular   • EYE MUSCLE SURGERY Left 1967   • EYE MUSCLE SURGERY Bilateral 1963       Medications  has a current medication list which includes the following prescription(s): ondansetron odt, rosuvastatin, tamsulosin, and oxycodone-acetaminophen.      Allergies  Allergies   Allergen Reactions   • Lipitor [Atorvastatin] Myalgia       Social History  Social History     Social History Narrative   • No narrative on file       Family History  He has no family history of bladder or kidney cancer  He has no family history of kidney stones      AUA Symptom Score:      Review of Systems  Review of Systems  Positive for abdominal pain constipation painful joints vomiting nausea negative and other categories.  Physical Exam  Physical Exam   Constitutional: He is  oriented to person, place, and time. He appears well-developed and well-nourished.   HENT:   Head: Normocephalic and atraumatic.   Neck: Normal range of motion.   Pulmonary/Chest: Effort normal. No respiratory distress.   Abdominal: Soft. He exhibits no distension and no mass. There is no tenderness. No hernia.   Genitourinary: Penis normal.   Musculoskeletal: Normal range of motion.   Lymphadenopathy:     He has no cervical adenopathy.   Neurological: He is alert and oriented to person, place, and time.   Skin: Skin is warm and dry.   Psychiatric: He has a normal mood and affect. His behavior is normal.   Vitals reviewed.      Labs Recent and today in the office:  Results for orders placed or performed in visit on 06/11/18   POC Urinalysis Dipstick, Automated   Result Value Ref Range    Color Yellow Yellow, Straw, Dark Yellow, Sheree    Clarity, UA Clear Clear    Specific Gravity  1.020 1.005 - 1.030    pH, Urine 6.0 5.0 - 8.0    Leukocytes Negative Negative    Nitrite, UA Negative Negative    Protein, POC Negative Negative mg/dL    Glucose, UA Negative Negative, 1000 mg/dL (3+) mg/dL    Ketones, UA Negative Negative    Urobilinogen, UA Normal Normal    Bilirubin Negative Negative    Blood, UA 3+ (A) Negative         Assessment & Plan  Ureteral calculus: The patient and has already passed this 4 mm stone from the proximal ureter to the pelvic ureter.  I reviewed all these films myself and it appears to be 2 x 4 mm and I think he will pass this spontaneously on medical propulsive therapy.  He is encouraged to take his Flomax on a regular basis eat a heart healthy diet and return in a week.

## 2018-06-18 ENCOUNTER — OFFICE VISIT (OUTPATIENT)
Dept: UROLOGY | Facility: CLINIC | Age: 58
End: 2018-06-18

## 2018-06-18 VITALS
DIASTOLIC BLOOD PRESSURE: 66 MMHG | HEIGHT: 70 IN | WEIGHT: 152 LBS | BODY MASS INDEX: 21.76 KG/M2 | SYSTOLIC BLOOD PRESSURE: 124 MMHG | HEART RATE: 62 BPM | TEMPERATURE: 97.9 F | OXYGEN SATURATION: 98 %

## 2018-06-18 DIAGNOSIS — N20.1 RIGHT URETERAL STONE: Primary | ICD-10-CM

## 2018-06-18 PROCEDURE — 99213 OFFICE O/P EST LOW 20 MIN: CPT | Performed by: UROLOGY

## 2018-06-18 RX ORDER — TAMSULOSIN HYDROCHLORIDE 0.4 MG/1
1 CAPSULE ORAL NIGHTLY
Qty: 14 CAPSULE | Refills: 11 | Status: SHIPPED | OUTPATIENT
Start: 2018-06-18 | End: 2019-04-11

## 2018-06-18 NOTE — PROGRESS NOTES
Chief Complaint  Nephrolithiasis (right ureteral stone, 1 week fup.)        BASSEM Pereyra is a 58 y.o. male who was known have a 2 x 4 mm calculus in his proximal ureter on original diagnosis with a follow-up KUB suggesting the stone had descended down to the distal ureter.  He states he has not had any additional pain urgency or frequency since last Thursday so he thinks she may have passed.    Vitals:    06/18/18 0907   BP: 124/66   Pulse: 62   Temp: 97.9 °F (36.6 °C)   SpO2: 98%       Past Medical History  Past Medical History:   Diagnosis Date   • Arthritis    • Heart murmur    • Hyperlipidemia        Past Surgical History  Past Surgical History:   Procedure Laterality Date   • BACK SURGERY Bilateral 03/1999   • CARDIAC CATHETERIZATION N/A 5/16/2018    Procedure: Coronary angiography;  Surgeon: Pteer Fragoso MD;  Location: [facility] CATH INVASIVE LOCATION;  Service: Cardiovascular   • EYE MUSCLE SURGERY Left 1967   • EYE MUSCLE SURGERY Bilateral 1963       Medications  has a current medication list which includes the following prescription(s): ondansetron odt, oxycodone-acetaminophen, rosuvastatin, and tamsulosin.      Allergies  Allergies   Allergen Reactions   • Lipitor [Atorvastatin] Myalgia       Social History  Social History     Social History Narrative   • No narrative on file       Family History  He has no family history of bladder or kidney cancer  He has no family history of kidney stones      AUA Symptom Score:      Review of Systems  Review of Systems   Constitutional: Negative.    Genitourinary: Negative.    All other systems reviewed and are negative.      Physical Exam  Physical Exam    Labs Recent and today in the office:  Results for orders placed or performed in visit on 06/11/18   POC Urinalysis Dipstick, Automated   Result Value Ref Range    Color Yellow Yellow, Straw, Dark Yellow, Sheree    Clarity, UA Clear Clear    Specific Gravity  1.020 1.005 - 1.030    pH, Urine 6.0 5.0 - 8.0     Leukocytes Negative Negative    Nitrite, UA Negative Negative    Protein, POC Negative Negative mg/dL    Glucose, UA Negative Negative, 1000 mg/dL (3+) mg/dL    Ketones, UA Negative Negative    Urobilinogen, UA Normal Normal    Bilirubin Negative Negative    Blood, UA 3+ (A) Negative         Assessment & Plan  #1 nephrolithiasis: Were not sure if he's passed the stone but if not he will.  He is encouraged to continue the Flomax and I suggested he return in 3 weeks for further evaluation.  He has a high co-pay however so He says he'll call me if he needs me.

## 2018-11-06 RX ORDER — ROSUVASTATIN CALCIUM 10 MG/1
TABLET, COATED ORAL
Qty: 90 TABLET | Refills: 3 | Status: SHIPPED | OUTPATIENT
Start: 2018-11-06 | End: 2020-02-20

## 2019-04-11 ENCOUNTER — OFFICE VISIT (OUTPATIENT)
Dept: INTERNAL MEDICINE | Facility: CLINIC | Age: 59
End: 2019-04-11

## 2019-04-11 VITALS
HEART RATE: 66 BPM | SYSTOLIC BLOOD PRESSURE: 119 MMHG | HEIGHT: 70 IN | OXYGEN SATURATION: 97 % | TEMPERATURE: 98.8 F | DIASTOLIC BLOOD PRESSURE: 72 MMHG | BODY MASS INDEX: 22.05 KG/M2 | WEIGHT: 154 LBS

## 2019-04-11 DIAGNOSIS — Z00.00 ROUTINE GENERAL MEDICAL EXAMINATION AT A HEALTH CARE FACILITY: Primary | ICD-10-CM

## 2019-04-11 PROCEDURE — 99396 PREV VISIT EST AGE 40-64: CPT | Performed by: INTERNAL MEDICINE

## 2019-04-11 NOTE — PROGRESS NOTES
Chief Complaint   Patient presents with   • Annual Exam       Saturnino Pereyra is a 59 y.o. male and is here for a comprehensive physical exam. The patient reports problems - rt elbow pain, bilat shoulder pain.     History:  Erectile dysfunction  no  Nocturia  no      Do you take any herbs or supplements that were not prescribed by a doctor? no    Are you taking aspirin daily? no      Health Habits:  Dental Exam. up to date  Eye Exam. up to date  Exercise: 3 times/week.  Current exercise activities include: housecleaning and yard work    Health Maintenance   Topic Date Due   • TDAP/TD VACCINES (1 - Tdap) 1977   • ZOSTER VACCINE (1 of 2) 2008   • HEPATITIS C SCREENING  2018   • COLONOSCOPY  2018   • LIPID PANEL  2019   • INFLUENZA VACCINE  2019   • ANNUAL PHYSICAL  2020       PMH, PSH, SocHx, FamHx, Allergies, and Medications: Reviewed and updated in the Visit Navigator.     Allergies   Allergen Reactions   • Lipitor [Atorvastatin] Myalgia     Past Medical History:   Diagnosis Date   • Arthritis    • Heart murmur    • Hyperlipidemia      Past Surgical History:   Procedure Laterality Date   • BACK SURGERY Bilateral 1999   • CARDIAC CATHETERIZATION N/A 2018    Procedure: Coronary angiography;  Surgeon: Peter Fragoso MD;  Location: [facility] INVASIVE LOCATION;  Service: Cardiovascular   • EYE MUSCLE SURGERY Left    • EYE MUSCLE SURGERY Bilateral      Social History     Socioeconomic History   • Marital status:      Spouse name: Not on file   • Number of children: Not on file   • Years of education: Not on file   • Highest education level: Not on file   Tobacco Use   • Smoking status: Former Smoker     Types: Cigarettes     Last attempt to quit: 1980     Years since quittin.3   • Smokeless tobacco: Never Used   Substance and Sexual Activity   • Alcohol use: No   • Drug use: No   • Sexual activity: Defer     Family History   Problem Relation Age of  "Onset   • Heart disease Mother    • Atrial fibrillation Mother    • Hypotension Mother    • Arthritis Mother    • Arrhythmia Mother         afib on Tikosyn   • Heart disease Father    • Heart attack Paternal Grandfather        Review of Systems  Review of Systems   Constitutional: Positive for fatigue. Negative for activity change, appetite change and fever.   HENT: Negative for congestion, ear discharge, ear pain and trouble swallowing.    Eyes: Negative for photophobia and visual disturbance.   Respiratory: Negative for cough and shortness of breath.    Cardiovascular: Negative for chest pain and palpitations.   Gastrointestinal: Negative for abdominal distention, abdominal pain, constipation, diarrhea, nausea and vomiting.   Endocrine: Negative.    Genitourinary: Negative for dysuria, hematuria and urgency.   Musculoskeletal: Positive for arthralgias and myalgias. Negative for back pain and joint swelling.   Skin: Negative for color change and rash.   Allergic/Immunologic: Negative.    Neurological: Negative for dizziness, weakness, light-headedness and headaches.   Hematological: Negative for adenopathy. Does not bruise/bleed easily.   Psychiatric/Behavioral: Negative for agitation, confusion and dysphoric mood. The patient is not nervous/anxious.        Vitals:    04/11/19 1508   BP: 119/72   Pulse: 66   Temp: 98.8 °F (37.1 °C)   SpO2: 97%       Objective   /72 Comment: Automatic  Pulse 66   Temp 98.8 °F (37.1 °C) (Temporal)   Ht 177.8 cm (70\")   Wt 69.9 kg (154 lb)   SpO2 97%   BMI 22.10 kg/m²     Physical Exam   Constitutional: He is oriented to person, place, and time. He appears well-developed and well-nourished. No distress.   HENT:   Nose: Nose normal.   Mouth/Throat: Oropharynx is clear and moist.   Eyes: Conjunctivae and EOM are normal. No scleral icterus.   Neck: No tracheal deviation present. No thyromegaly present.   Cardiovascular: Normal rate and regular rhythm. Exam reveals no friction " rub.   No murmur heard.  Pulmonary/Chest: No respiratory distress. He has no wheezes. He has no rales.   Abdominal: Soft. He exhibits no distension and no mass. There is no tenderness. There is no guarding.   Musculoskeletal: Normal range of motion. He exhibits no deformity.   Tender over rt epicondyle   Lymphadenopathy:     He has no cervical adenopathy.   Neurological: He is alert and oriented to person, place, and time. He has normal reflexes. No cranial nerve deficit. Coordination normal.   Skin: Skin is warm and dry. No rash noted. No erythema.   Psychiatric: He has a normal mood and affect. His behavior is normal. Judgment and thought content normal.       The 10-year CVD risk score (D'Agostino, et al., 2008) is: 16.5%    Values used to calculate the score:      Age: 59 years      Sex: Male      Diabetic: No      Tobacco smoker: No      Systolic Blood Pressure: 119 mmHg      Is BP treated: No      HDL Cholesterol: 47 mg/dL      Total Cholesterol: 244 mg/dL    Lab Results   Component Value Date    CHLPL 244 (H) 04/09/2018    TRIG 209 (H) 04/09/2018    HDL 47 04/09/2018     (H) 04/09/2018     Glucose   Date Value Ref Range Status   06/06/2018 117 (H) 74 - 98 mg/dL Final     BUN   Date Value Ref Range Status   06/06/2018 16 7 - 20 mg/dL Final     Creatinine   Date Value Ref Range Status   06/06/2018 0.80 0.60 - 1.30 mg/dL Final     Sodium   Date Value Ref Range Status   06/06/2018 143 137 - 145 mmol/L Final     Potassium   Date Value Ref Range Status   06/06/2018 4.4 3.5 - 5.1 mmol/L Final     Chloride   Date Value Ref Range Status   06/06/2018 103 98 - 107 mmol/L Final     CO2   Date Value Ref Range Status   06/06/2018 27.0 26.0 - 30.0 mmol/L Final     Calcium   Date Value Ref Range Status   06/06/2018 10.1 8.4 - 10.2 mg/dL Final     Total Protein   Date Value Ref Range Status   06/06/2018 8.2 6.3 - 8.2 g/dL Final     Albumin   Date Value Ref Range Status   06/06/2018 4.80 3.50 - 5.00 g/dL Final     ALT  (SGPT)   Date Value Ref Range Status   06/06/2018 32 13 - 69 U/L Final     AST (SGOT)   Date Value Ref Range Status   06/06/2018 32 15 - 46 U/L Final     Alkaline Phosphatase   Date Value Ref Range Status   06/06/2018 94 38 - 126 U/L Final     Total Bilirubin   Date Value Ref Range Status   06/06/2018 0.5 0.2 - 1.3 mg/dL Final     eGFR Non  Amer   Date Value Ref Range Status   06/06/2018 99 >60 mL/min/1.73 Final     A/G Ratio   Date Value Ref Range Status   04/09/2018 1.4 1.0 - 2.0 g/dL Final     BUN/Creatinine Ratio   Date Value Ref Range Status   06/06/2018 20.0 6.3 - 21.9 Final     Anion Gap   Date Value Ref Range Status   06/06/2018 17.4 10.0 - 20.0 mmol/L Final     Lab Results   Component Value Date    WBC 13.21 (H) 06/06/2018    HGB 14.2 06/06/2018    HCT 41.7 (L) 06/06/2018    MCV 94.3 (H) 06/06/2018     06/06/2018       Assessment/Plan   1. Healthy male exam.    2. Patient Counseling: Including but not Limited to the following, when appropriate:  --Nutrition: Stressed importance of moderation in sodium/caffeine intake, saturated fat and cholesterol, caloric balance, sufficient intake of fresh fruits, vegetables, fiber  .--Discussed the issue of daily use of baby aspirin.  --Exercise: Stressed the importance of regular exercise.   --Substance Abuse: Discussed cessation/primary prevention of tobacco, alcohol, or other drug use; driving or other dangerous activities under the influence; availability of treatment for abuse, as indicated based on social history.    --Sexuality: Discussed sexually transmitted diseases, partner selection, use of condoms and contraceptive alternatives.   --Injury prevention: Discussed safety belts, safety helmets, smoke detector, smoking near bedding or upholstery.   --Dental health: Discussed importance of regular tooth brushing, flossing, and dental visits.  --Immunizations reviewed.  --Discussed benefits of colon cancer screening.      3. Discussed the patient's BMI  with him.  The BMI is in the acceptable range  4. No Follow-up on file.  5. Age-appropriate Screening Scheduled  6. There are no Patient Instructions on file for this visit.    Assessment/Plan     Saturnino was seen today for annual exam.    Diagnoses and all orders for this visit:    Routine general medical examination at a health care facility

## 2019-04-15 LAB
ALBUMIN SERPL-MCNC: 4.4 G/DL (ref 3.5–5)
ALBUMIN/GLOB SERPL: 1.5 G/DL (ref 1–2)
ALP SERPL-CCNC: 83 U/L (ref 38–126)
ALT SERPL-CCNC: 29 U/L (ref 13–69)
AST SERPL-CCNC: 24 U/L (ref 15–46)
BILIRUB SERPL-MCNC: 0.4 MG/DL (ref 0.2–1.3)
BUN SERPL-MCNC: 12 MG/DL (ref 7–20)
BUN/CREAT SERPL: 17.1 (ref 6.3–21.9)
CALCIUM SERPL-MCNC: 9.9 MG/DL (ref 8.4–10.2)
CHLORIDE SERPL-SCNC: 102 MMOL/L (ref 98–107)
CHOLEST SERPL-MCNC: 206 MG/DL (ref 0–199)
CO2 SERPL-SCNC: 32 MMOL/L (ref 26–30)
CREAT SERPL-MCNC: 0.7 MG/DL (ref 0.6–1.3)
GLOBULIN SER CALC-MCNC: 3 GM/DL
GLUCOSE SERPL-MCNC: 97 MG/DL (ref 74–98)
HDLC SERPL-MCNC: 43 MG/DL (ref 40–60)
LDLC SERPL CALC-MCNC: 115 MG/DL (ref 0–99)
POTASSIUM SERPL-SCNC: 4.5 MMOL/L (ref 3.5–5.1)
PROT SERPL-MCNC: 7.4 G/DL (ref 6.3–8.2)
SODIUM SERPL-SCNC: 141 MMOL/L (ref 137–145)
TRIGL SERPL-MCNC: 240 MG/DL
VLDLC SERPL CALC-MCNC: 48 MG/DL

## 2020-02-20 ENCOUNTER — OFFICE VISIT (OUTPATIENT)
Dept: INTERNAL MEDICINE | Facility: CLINIC | Age: 60
End: 2020-02-20

## 2020-02-20 VITALS
SYSTOLIC BLOOD PRESSURE: 120 MMHG | HEIGHT: 70 IN | DIASTOLIC BLOOD PRESSURE: 74 MMHG | WEIGHT: 155 LBS | TEMPERATURE: 98 F | BODY MASS INDEX: 22.19 KG/M2 | HEART RATE: 72 BPM | OXYGEN SATURATION: 98 %

## 2020-02-20 DIAGNOSIS — R07.81 RIB PAIN ON LEFT SIDE: Primary | ICD-10-CM

## 2020-02-20 PROCEDURE — 99213 OFFICE O/P EST LOW 20 MIN: CPT | Performed by: INTERNAL MEDICINE

## 2020-02-20 NOTE — PROGRESS NOTES
"Subjective  Saturnino Pereyra is a 59 y.o. male    HPI complains of an episode of left-sided rib pain without associated trauma this lasted for a few hours and subsided subsequently.  He feels there was a knot over 1 of his ribs at that time.  Currently without complaints    The following portions of the patient's history were reviewed and updated as appropriate: allergies, current medications, past family history, past medical history, past social history, past surgical history, and problem list.     Review of Systems   Constitutional: Negative.  Negative for activity change, appetite change, fatigue and fever.   HENT: Negative for congestion, ear discharge, ear pain and trouble swallowing.    Eyes: Negative for photophobia and visual disturbance.   Respiratory: Negative for cough and shortness of breath.    Cardiovascular: Positive for chest pain. Negative for palpitations.   Gastrointestinal: Negative for abdominal distention, abdominal pain, constipation, diarrhea, nausea and vomiting.   Endocrine: Negative.    Genitourinary: Negative for dysuria, hematuria and urgency.   Musculoskeletal: Positive for arthralgias. Negative for back pain, joint swelling and myalgias.   Skin: Negative for color change and rash.   Allergic/Immunologic: Negative.    Neurological: Negative for dizziness, weakness, light-headedness and headaches.   Hematological: Negative for adenopathy. Does not bruise/bleed easily.   Psychiatric/Behavioral: Negative for agitation, confusion and dysphoric mood. The patient is not nervous/anxious.        Visit Vitals  /74   Pulse 72   Temp 98 °F (36.7 °C) (Temporal)   Ht 177.8 cm (70\")   Wt 70.3 kg (155 lb)   SpO2 98%   BMI 22.24 kg/m²       Objective  Physical Exam   Constitutional: He is oriented to person, place, and time. He appears well-developed and well-nourished. No distress.   HENT:   Nose: Nose normal.   Mouth/Throat: Oropharynx is clear and moist.   Eyes: Conjunctivae and EOM are normal. " No scleral icterus.   Neck: No tracheal deviation present. No thyromegaly present.   Cardiovascular: Normal rate and regular rhythm. Exam reveals no friction rub.   No murmur heard.  Pulmonary/Chest: No respiratory distress. He has no wheezes. He has no rales.   Abdominal: Soft. He exhibits no distension and no mass. There is no tenderness. There is no guarding.   Musculoskeletal: Normal range of motion. He exhibits no deformity.   Lymphadenopathy:     He has no cervical adenopathy.   Neurological: He is alert and oriented to person, place, and time. He has normal reflexes. No cranial nerve deficit. Coordination normal.   Skin: Skin is warm and dry. No rash noted. No erythema.   Psychiatric: He has a normal mood and affect. His behavior is normal. Judgment and thought content normal.       Diagnoses and all orders for this visit:    Rib pain on left side.  Exam unremarkable imaging studies if symptoms recur.  Tylenol as needed

## 2020-06-29 ENCOUNTER — OFFICE VISIT (OUTPATIENT)
Dept: INTERNAL MEDICINE | Facility: CLINIC | Age: 60
End: 2020-06-29

## 2020-06-29 VITALS
SYSTOLIC BLOOD PRESSURE: 130 MMHG | TEMPERATURE: 98 F | DIASTOLIC BLOOD PRESSURE: 70 MMHG | BODY MASS INDEX: 22.19 KG/M2 | HEART RATE: 76 BPM | OXYGEN SATURATION: 98 % | HEIGHT: 70 IN | WEIGHT: 155 LBS

## 2020-06-29 DIAGNOSIS — E78.2 MIXED HYPERLIPIDEMIA: ICD-10-CM

## 2020-06-29 DIAGNOSIS — Z00.00 ROUTINE GENERAL MEDICAL EXAMINATION AT A HEALTH CARE FACILITY: ICD-10-CM

## 2020-06-29 DIAGNOSIS — H10.503 BLEPHAROCONJUNCTIVITIS OF BOTH EYES, UNSPECIFIED BLEPHAROCONJUNCTIVITIS TYPE: Primary | ICD-10-CM

## 2020-06-29 PROBLEM — R00.2 PALPITATIONS: Status: RESOLVED | Noted: 2018-04-20 | Resolved: 2020-06-29

## 2020-06-29 PROBLEM — R94.39 ABNORMAL STRESS ECG WITH TREADMILL: Status: RESOLVED | Noted: 2018-05-14 | Resolved: 2020-06-29

## 2020-06-29 PROCEDURE — 99396 PREV VISIT EST AGE 40-64: CPT | Performed by: INTERNAL MEDICINE

## 2020-06-29 NOTE — PROGRESS NOTES
Chief Complaint   Patient presents with   • Eye Problem     follow up on eye swelling and discharge        Saturnino Pereyra is a 60 y.o. male and is here for a comprehensive physical exam. The patient reports problems - apthous ulcers, conjunctivitis.     History:  Erectile dysfunction  no  Nocturia  no      Do you take any herbs or supplements that were not prescribed by a doctor? no    Are you taking aspirin daily? no      Health Habits:  Dental Exam. up to date  Eye Exam. up to date  Exercise: 3 times/week.  Current exercise activities include: housecleaning and yard work    Health Maintenance   Topic Date Due   • TDAP/TD VACCINES (1 - Tdap) 1969   • HEPATITIS C SCREENING  2018   • COLONOSCOPY  2018   • ANNUAL PHYSICAL  2020   • LIPID PANEL  04/15/2020   • ZOSTER VACCINE (1 of 2) 2029 (Originally 2008)   • INFLUENZA VACCINE  2020       PMH, PSH, SocHx, FamHx, Allergies, and Medications: Reviewed and updated in the Visit Navigator.     Allergies   Allergen Reactions   • Lipitor [Atorvastatin] Myalgia     Past Medical History:   Diagnosis Date   • Arthritis    • Heart murmur    • Hyperlipidemia      Past Surgical History:   Procedure Laterality Date   • BACK SURGERY Bilateral 1999   • CARDIAC CATHETERIZATION N/A 2018    Procedure: Coronary angiography;  Surgeon: Peter Fragoso MD;  Location: [facility] INVASIVE LOCATION;  Service: Cardiovascular   • EYE MUSCLE SURGERY Left    • EYE MUSCLE SURGERY Bilateral      Social History     Socioeconomic History   • Marital status:      Spouse name: Not on file   • Number of children: Not on file   • Years of education: Not on file   • Highest education level: Not on file   Tobacco Use   • Smoking status: Former Smoker     Types: Cigarettes     Last attempt to quit: 1980     Years since quittin.5   • Smokeless tobacco: Never Used   Substance and Sexual Activity   • Alcohol use: No   • Drug use: No   •  "Sexual activity: Defer     Family History   Problem Relation Age of Onset   • Heart disease Mother    • Atrial fibrillation Mother    • Hypotension Mother    • Arthritis Mother    • Arrhythmia Mother         afib on Tikosyn   • Heart disease Father    • Heart attack Paternal Grandfather        Review of Systems  Review of Systems   Constitutional: Positive for fatigue. Negative for activity change, appetite change and fever.   HENT: Negative for congestion, ear discharge, ear pain and trouble swallowing.    Eyes: Positive for discharge and itching. Negative for photophobia and visual disturbance.   Respiratory: Negative for cough and shortness of breath.    Cardiovascular: Negative for chest pain and palpitations.   Gastrointestinal: Negative for abdominal distention, abdominal pain, constipation, diarrhea, nausea and vomiting.   Endocrine: Negative.    Genitourinary: Negative for dysuria, hematuria and urgency.   Musculoskeletal: Negative for arthralgias, back pain, joint swelling and myalgias.   Skin: Negative for color change and rash.   Allergic/Immunologic: Negative.    Neurological: Negative for dizziness, weakness, light-headedness and headaches.   Hematological: Negative for adenopathy. Does not bruise/bleed easily.   Psychiatric/Behavioral: Negative for agitation, confusion and dysphoric mood. The patient is not nervous/anxious.        Vitals:    06/29/20 0933   BP: 130/70   Pulse: 76   Temp: 98 °F (36.7 °C)   SpO2: 98%       Objective   /70   Pulse 76   Temp 98 °F (36.7 °C) (Temporal)   Ht 177.8 cm (70\")   Wt 70.3 kg (155 lb)   SpO2 98%   BMI 22.24 kg/m²     Physical Exam   Constitutional: He is oriented to person, place, and time. He appears well-developed and well-nourished. No distress.   HENT:   Nose: Nose normal.   Mouth/Throat: Oropharynx is clear and moist.   Eyes: Conjunctivae and EOM are normal. No scleral icterus.   Neck: No tracheal deviation present. No thyromegaly present. "   Cardiovascular: Normal rate and regular rhythm. Exam reveals no friction rub.   No murmur heard.  Pulmonary/Chest: No respiratory distress. He has no wheezes. He has no rales.   Abdominal: Soft. He exhibits no distension and no mass. There is no tenderness. There is no guarding.   Musculoskeletal: Normal range of motion. He exhibits deformity.   Lymphadenopathy:     He has no cervical adenopathy.   Neurological: He is alert and oriented to person, place, and time. He has normal reflexes. No cranial nerve deficit. Coordination normal.   Skin: Skin is warm and dry. No rash noted. No erythema.   Psychiatric: He has a normal mood and affect. His behavior is normal. Judgment and thought content normal.       The 10-year CVD risk score (D'Agostino, et al., 2008) is: 18.3%    Values used to calculate the score:      Age: 60 years      Sex: Male      Diabetic: No      Tobacco smoker: No      Systolic Blood Pressure: 130 mmHg      Is BP treated: No      HDL Cholesterol: 43 mg/dL      Total Cholesterol: 206 mg/dL    Lab Results   Component Value Date    CHLPL 206 (H) 04/15/2019    TRIG 240 (H) 04/15/2019    HDL 43 04/15/2019     (H) 04/15/2019     Glucose   Date Value Ref Range Status   06/06/2018 117 (H) 74 - 98 mg/dL Final     BUN   Date Value Ref Range Status   04/15/2019 12 7 - 20 mg/dL Final   06/06/2018 16 7 - 20 mg/dL Final     Creatinine   Date Value Ref Range Status   04/15/2019 0.70 0.60 - 1.30 mg/dL Final   06/06/2018 0.80 0.60 - 1.30 mg/dL Final     Sodium   Date Value Ref Range Status   04/15/2019 141 137 - 145 mmol/L Final   06/06/2018 143 137 - 145 mmol/L Final     Potassium   Date Value Ref Range Status   04/15/2019 4.5 3.5 - 5.1 mmol/L Final   06/06/2018 4.4 3.5 - 5.1 mmol/L Final     Chloride   Date Value Ref Range Status   04/15/2019 102 98 - 107 mmol/L Final   06/06/2018 103 98 - 107 mmol/L Final     CO2   Date Value Ref Range Status   06/06/2018 27.0 26.0 - 30.0 mmol/L Final     Total CO2   Date  Value Ref Range Status   04/15/2019 32.0 (H) 26.0 - 30.0 mmol/L Final     Calcium   Date Value Ref Range Status   04/15/2019 9.9 8.4 - 10.2 mg/dL Final   06/06/2018 10.1 8.4 - 10.2 mg/dL Final     Total Protein   Date Value Ref Range Status   06/06/2018 8.2 6.3 - 8.2 g/dL Final     Albumin   Date Value Ref Range Status   04/15/2019 4.40 3.50 - 5.00 g/dL Final   06/06/2018 4.80 3.50 - 5.00 g/dL Final     ALT (SGPT)   Date Value Ref Range Status   04/15/2019 29 13 - 69 U/L Final   06/06/2018 32 13 - 69 U/L Final     AST (SGOT)   Date Value Ref Range Status   04/15/2019 24 15 - 46 U/L Final   06/06/2018 32 15 - 46 U/L Final     Alkaline Phosphatase   Date Value Ref Range Status   04/15/2019 83 38 - 126 U/L Final   06/06/2018 94 38 - 126 U/L Final     Total Bilirubin   Date Value Ref Range Status   04/15/2019 0.4 0.2 - 1.3 mg/dL Final   06/06/2018 0.5 0.2 - 1.3 mg/dL Final     eGFR Non  Am   Date Value Ref Range Status   04/15/2019 115 >60 mL/min/1.73 Final     eGFR Non  Amer   Date Value Ref Range Status   06/06/2018 99 >60 mL/min/1.73 Final     A/G Ratio   Date Value Ref Range Status   04/15/2019 1.5 1.0 - 2.0 g/dL Final     BUN/Creatinine Ratio   Date Value Ref Range Status   04/15/2019 17.1 6.3 - 21.9 Final   06/06/2018 20.0 6.3 - 21.9 Final     Anion Gap   Date Value Ref Range Status   06/06/2018 17.4 10.0 - 20.0 mmol/L Final     Lab Results   Component Value Date    WBC 13.21 (H) 06/06/2018    HGB 14.2 06/06/2018    HCT 41.7 (L) 06/06/2018    MCV 94.3 (H) 06/06/2018     06/06/2018       Assessment/Plan   1. Healthy male exam.   2. Patient Counseling: Including but not Limited to the following, when appropriate:  --Nutrition: Stressed importance of moderation in sodium/caffeine intake, saturated fat and cholesterol, caloric balance, sufficient intake of fresh fruits, vegetables, fiber  .--Discussed the issue of daily use of baby aspirin.  --Exercise: Stressed the importance of regular exercise.    --Substance Abuse: Discussed cessation/primary prevention of tobacco, alcohol, or other drug use; driving or other dangerous activities under the influence; availability of treatment for abuse, as indicated based on social history.    --Sexuality: Discussed sexually transmitted diseases, partner selection, use of condoms and contraceptive alternatives.   --Injury prevention: Discussed safety belts, safety helmets, smoke detector, smoking near bedding or upholstery.   --Dental health: Discussed importance of regular tooth brushing, flossing, and dental visits.  --Immunizations reviewed.  --Discussed benefits of colon cancer screening.      3. Discussed the patient's BMI with him.  The BMI is in the acceptable range  4. No follow-ups on file.  5. Age-appropriate Screening Scheduled  6. There are no Patient Instructions on file for this visit.    Assessment/Plan     Saturnino was seen today for eye problem.    Diagnoses and all orders for this visit:    Blepharoconjunctivitis of both eyes, unspecified blepharoconjunctivitis type with complains of occasional dysuria.  Exam of the penis unremarkable.  Rule out autoimmune disorder referral for ophthalmology evaluation.  In the meantime check C-reactive protein level along with a celiac disease panel with history of aphthous ulcers.  Check CBC CMP    Mixed hyperlipidemia continue with the dietary restrictions check LDL level    Routine general medical examination at a health care facility

## 2020-06-30 LAB
ALBUMIN SERPL-MCNC: 5 G/DL (ref 3.5–5.2)
ALBUMIN/GLOB SERPL: 1.8 G/DL
ALP SERPL-CCNC: 102 U/L (ref 39–117)
ALT SERPL-CCNC: 18 U/L (ref 1–41)
AST SERPL-CCNC: 14 U/L (ref 1–40)
BILIRUB SERPL-MCNC: 0.3 MG/DL (ref 0.2–1.2)
BUN SERPL-MCNC: 9 MG/DL (ref 8–23)
BUN/CREAT SERPL: 10.7 (ref 7–25)
CALCIUM SERPL-MCNC: 9.9 MG/DL (ref 8.6–10.5)
CHLORIDE SERPL-SCNC: 102 MMOL/L (ref 98–107)
CO2 SERPL-SCNC: 29.6 MMOL/L (ref 22–29)
CREAT SERPL-MCNC: 0.84 MG/DL (ref 0.76–1.27)
CRP SERPL-MCNC: 0.08 MG/DL (ref 0–0.5)
ENDOMYSIUM IGA SER QL: NEGATIVE
ERYTHROCYTE [DISTWIDTH] IN BLOOD BY AUTOMATED COUNT: 12.5 % (ref 12.3–15.4)
GLOBULIN SER CALC-MCNC: 2.8 GM/DL
GLUCOSE SERPL-MCNC: 107 MG/DL (ref 65–99)
HCT VFR BLD AUTO: 42.3 % (ref 37.5–51)
HCV AB S/CO SERPL IA: <0.1 S/CO RATIO (ref 0–0.9)
HGB BLD-MCNC: 14.6 G/DL (ref 13–17.7)
IGA SERPL-MCNC: 158 MG/DL (ref 61–437)
LDLC SERPL DIRECT ASSAY-MCNC: 135 MG/DL (ref 0–99)
MCH RBC QN AUTO: 32.1 PG (ref 26.6–33)
MCHC RBC AUTO-ENTMCNC: 34.5 G/DL (ref 31.5–35.7)
MCV RBC AUTO: 93 FL (ref 79–97)
PLATELET # BLD AUTO: 271 10*3/MM3 (ref 140–450)
POTASSIUM SERPL-SCNC: 4.7 MMOL/L (ref 3.5–5.2)
PROT SERPL-MCNC: 7.8 G/DL (ref 6–8.5)
RBC # BLD AUTO: 4.55 10*6/MM3 (ref 4.14–5.8)
SODIUM SERPL-SCNC: 141 MMOL/L (ref 136–145)
TTG IGA SER-ACNC: <2 U/ML (ref 0–3)
VIT B12 SERPL-MCNC: 455 PG/ML (ref 211–946)
WBC # BLD AUTO: 5.76 10*3/MM3 (ref 3.4–10.8)

## 2020-08-31 ENCOUNTER — OFFICE VISIT (OUTPATIENT)
Dept: INTERNAL MEDICINE | Facility: CLINIC | Age: 60
End: 2020-08-31

## 2020-08-31 VITALS
OXYGEN SATURATION: 99 % | HEIGHT: 70 IN | BODY MASS INDEX: 22.9 KG/M2 | SYSTOLIC BLOOD PRESSURE: 110 MMHG | WEIGHT: 160 LBS | HEART RATE: 60 BPM | TEMPERATURE: 97.3 F | DIASTOLIC BLOOD PRESSURE: 70 MMHG

## 2020-08-31 DIAGNOSIS — E78.2 MIXED HYPERLIPIDEMIA: Primary | ICD-10-CM

## 2020-08-31 DIAGNOSIS — Z23 NEED FOR VACCINATION: ICD-10-CM

## 2020-08-31 PROCEDURE — 90686 IIV4 VACC NO PRSV 0.5 ML IM: CPT | Performed by: INTERNAL MEDICINE

## 2020-08-31 PROCEDURE — 99213 OFFICE O/P EST LOW 20 MIN: CPT | Performed by: INTERNAL MEDICINE

## 2020-08-31 PROCEDURE — 90471 IMMUNIZATION ADMIN: CPT | Performed by: INTERNAL MEDICINE

## 2020-08-31 NOTE — PROGRESS NOTES
"Subjective  Saturnino Pereyra is a 60 y.o. male    HPI coming in for follow-up patient with dyslipidemia is compliant with his diet.  He denies alcohol or tobacco use    The following portions of the patient's history were reviewed and updated as appropriate: allergies, current medications, past family history, past medical history, past social history, past surgical history, and problem list.     Review of Systems   Constitutional: Negative.  Negative for activity change, appetite change, fatigue and fever.   HENT: Negative for congestion, ear discharge, ear pain and trouble swallowing.    Eyes: Negative for photophobia and visual disturbance.   Respiratory: Negative for cough and shortness of breath.    Cardiovascular: Negative for chest pain and palpitations.   Gastrointestinal: Negative for abdominal distention, abdominal pain, constipation, diarrhea, nausea and vomiting.   Endocrine: Negative.    Genitourinary: Negative for dysuria, hematuria and urgency.   Musculoskeletal: Positive for arthralgias. Negative for back pain, joint swelling and myalgias.   Skin: Negative for color change and rash.   Allergic/Immunologic: Negative.    Neurological: Negative for dizziness, weakness, light-headedness and headaches.   Hematological: Negative for adenopathy. Does not bruise/bleed easily.   Psychiatric/Behavioral: Negative for agitation, confusion and dysphoric mood. The patient is not nervous/anxious.        Visit Vitals  /70   Pulse 60   Temp 97.3 °F (36.3 °C) (Temporal)   Ht 177.8 cm (70\")   Wt 72.6 kg (160 lb)   SpO2 99%   BMI 22.96 kg/m²       Objective  Physical Exam   Constitutional: He is oriented to person, place, and time. He appears well-developed and well-nourished. No distress.   HENT:   Nose: Nose normal.   Mouth/Throat: Oropharynx is clear and moist.   Eyes: Conjunctivae and EOM are normal. No scleral icterus.   Neck: No tracheal deviation present. No thyromegaly present.   Cardiovascular: Normal rate " and regular rhythm. Exam reveals no friction rub.   No murmur heard.  Pulmonary/Chest: No respiratory distress. He has no wheezes. He has no rales.   Abdominal: Soft. He exhibits no distension and no mass. There is no tenderness. There is no guarding.   Musculoskeletal: Normal range of motion. He exhibits deformity.   Lymphadenopathy:     He has no cervical adenopathy.   Neurological: He is alert and oriented to person, place, and time. He has normal reflexes. No cranial nerve deficit. Coordination normal.   Skin: Skin is warm and dry. No rash noted. No erythema.   Psychiatric: He has a normal mood and affect. His behavior is normal. Judgment and thought content normal.       Diagnoses and all orders for this visit:    Mixed hyperlipidemia continue with the dietary restrictions follow lipid profile

## 2021-03-09 ENCOUNTER — IMMUNIZATION (OUTPATIENT)
Dept: VACCINE CLINIC | Facility: HOSPITAL | Age: 61
End: 2021-03-09

## 2021-03-09 PROCEDURE — 0001A: CPT | Performed by: INTERNAL MEDICINE

## 2021-03-09 PROCEDURE — 91300 HC SARSCOV02 VAC 30MCG/0.3ML IM: CPT | Performed by: INTERNAL MEDICINE

## 2021-03-30 ENCOUNTER — IMMUNIZATION (OUTPATIENT)
Dept: VACCINE CLINIC | Facility: HOSPITAL | Age: 61
End: 2021-03-30

## 2021-03-30 PROCEDURE — 91300 HC SARSCOV02 VAC 30MCG/0.3ML IM: CPT | Performed by: INTERNAL MEDICINE

## 2021-03-30 PROCEDURE — 0002A: CPT | Performed by: INTERNAL MEDICINE

## 2022-02-18 ENCOUNTER — OFFICE VISIT (OUTPATIENT)
Dept: INTERNAL MEDICINE | Facility: CLINIC | Age: 62
End: 2022-02-18

## 2022-02-18 VITALS
DIASTOLIC BLOOD PRESSURE: 82 MMHG | HEIGHT: 70 IN | HEART RATE: 83 BPM | WEIGHT: 164 LBS | BODY MASS INDEX: 23.48 KG/M2 | SYSTOLIC BLOOD PRESSURE: 140 MMHG | TEMPERATURE: 97.3 F | OXYGEN SATURATION: 100 %

## 2022-02-18 DIAGNOSIS — Z00.00 ROUTINE GENERAL MEDICAL EXAMINATION AT A HEALTH CARE FACILITY: Primary | ICD-10-CM

## 2022-02-18 DIAGNOSIS — Z12.11 SCREEN FOR COLON CANCER: ICD-10-CM

## 2022-02-18 PROCEDURE — 99396 PREV VISIT EST AGE 40-64: CPT | Performed by: INTERNAL MEDICINE

## 2022-02-18 NOTE — PROGRESS NOTES
Chief Complaint   Patient presents with   • Annual Exam     having neck and right shoulder pain - started in  - no known injury        Saturnino Pereyra is a 61 y.o. male and is here for a comprehensive physical exam. The patient reports problems - neck pain,.     History:  Erectile dysfunction  no  Nocturia  no      Do you take any herbs or supplements that were not prescribed by a doctor? no    Are you taking aspirin daily? no      Health Habits:  Dental Exam. up to date  Eye Exam. up to date  Exercise: 4 times/week.  Current exercise activities include: walking    Health Maintenance   Topic Date Due   • COLORECTAL CANCER SCREENING  Never done   • TDAP/TD VACCINES (1 - Tdap) Never done   • LIPID PANEL  2021   • ZOSTER VACCINE (1 of 2) 2029 (Originally 2008)   • ANNUAL PHYSICAL  2023   • HEPATITIS C SCREENING  Completed   • COVID-19 Vaccine  Completed   • INFLUENZA VACCINE  Completed   • Pneumococcal Vaccine 0-64  Aged Out       PMH, PSH, SocHx, FamHx, Allergies, and Medications: Reviewed and updated in the Visit Navigator.     Allergies   Allergen Reactions   • Lipitor [Atorvastatin] Myalgia     Past Medical History:   Diagnosis Date   • Arthritis    • Heart murmur    • Hyperlipidemia      Past Surgical History:   Procedure Laterality Date   • BACK SURGERY Bilateral 1999   • CARDIAC CATHETERIZATION N/A 2018    Procedure: Coronary angiography;  Surgeon: Peter Fragoso MD;  Location: [facility] INVASIVE LOCATION;  Service: Cardiovascular   • EYE MUSCLE SURGERY Left    • EYE MUSCLE SURGERY Bilateral      Social History     Socioeconomic History   • Marital status:    Tobacco Use   • Smoking status: Former Smoker     Packs/day: 0.25     Years: 1.50     Pack years: 0.37     Types: Cigarettes     Quit date:      Years since quittin.1   • Smokeless tobacco: Never Used   Substance and Sexual Activity   • Alcohol use: No   • Drug use: No   • Sexual activity: Defer  "    Family History   Problem Relation Age of Onset   • Heart disease Mother    • Atrial fibrillation Mother    • Hypotension Mother    • Arthritis Mother    • Arrhythmia Mother         afib on Tikosyn   • Heart disease Father    • Heart attack Paternal Grandfather        Review of Systems  Review of Systems   Constitutional: Negative.  Negative for activity change, appetite change, fatigue and fever.   HENT: Negative for congestion, ear discharge, ear pain and trouble swallowing.    Eyes: Negative for photophobia and visual disturbance.   Respiratory: Negative for cough and shortness of breath.    Cardiovascular: Negative for chest pain and palpitations.   Gastrointestinal: Negative for abdominal distention, abdominal pain, constipation, diarrhea, nausea and vomiting.   Endocrine: Negative.    Genitourinary: Negative for dysuria, hematuria and urgency.   Musculoskeletal: Positive for arthralgias. Negative for back pain, joint swelling and myalgias.   Skin: Positive for rash. Negative for color change.   Allergic/Immunologic: Negative.    Neurological: Negative for dizziness, weakness, light-headedness and headaches.   Hematological: Negative for adenopathy. Does not bruise/bleed easily.   Psychiatric/Behavioral: Negative for agitation, confusion and dysphoric mood. The patient is not nervous/anxious.        Vitals:    02/18/22 1042   BP: 140/82   Pulse: 83   Temp: 97.3 °F (36.3 °C)   SpO2: 100%       Objective   /82   Pulse 83   Temp 97.3 °F (36.3 °C) (Infrared)   Ht 177.8 cm (70\")   Wt 74.4 kg (164 lb)   SpO2 100%   BMI 23.53 kg/m²     Physical Exam  Constitutional:       General: He is not in acute distress.     Appearance: He is well-developed.   HENT:      Nose: Nose normal.   Eyes:      General: No scleral icterus.     Conjunctiva/sclera: Conjunctivae normal.   Neck:      Thyroid: No thyromegaly.      Trachea: No tracheal deviation.   Cardiovascular:      Rate and Rhythm: Normal rate and regular " rhythm.      Heart sounds: No murmur heard.  No friction rub.   Pulmonary:      Effort: No respiratory distress.      Breath sounds: No wheezing or rales.   Abdominal:      General: There is no distension.      Palpations: Abdomen is soft. There is no mass.      Tenderness: There is no abdominal tenderness. There is no guarding.   Musculoskeletal:         General: No deformity. Normal range of motion.   Lymphadenopathy:      Cervical: No cervical adenopathy.   Skin:     General: Skin is warm and dry.      Findings: No erythema or rash.   Neurological:      Mental Status: He is alert and oriented to person, place, and time.      Cranial Nerves: No cranial nerve deficit.      Coordination: Coordination normal.      Deep Tendon Reflexes: Reflexes are normal and symmetric.   Psychiatric:         Behavior: Behavior normal.         Thought Content: Thought content normal.         Judgment: Judgment normal.         The 10-year CVD risk score (D'Agostino, et al., 2008) is: 21.7%    Values used to calculate the score:      Age: 61 years      Sex: Male      Diabetic: No      Tobacco smoker: No      Systolic Blood Pressure: 140 mmHg      Is BP treated: No      HDL Cholesterol: 43 mg/dL      Total Cholesterol: 206 mg/dL    Lab Results   Component Value Date    CHLPL 206 (H) 04/15/2019    TRIG 240 (H) 04/15/2019    HDL 43 04/15/2019     (H) 06/29/2020     Glucose   Date Value Ref Range Status   06/29/2020 107 (H) 65 - 99 mg/dL Final   06/06/2018 117 (H) 74 - 98 mg/dL Final     BUN   Date Value Ref Range Status   06/29/2020 9 8 - 23 mg/dL Final   06/06/2018 16 7 - 20 mg/dL Final     Creatinine   Date Value Ref Range Status   06/29/2020 0.84 0.76 - 1.27 mg/dL Final   06/06/2018 0.80 0.60 - 1.30 mg/dL Final     Sodium   Date Value Ref Range Status   06/29/2020 141 136 - 145 mmol/L Final   06/06/2018 143 137 - 145 mmol/L Final     Potassium   Date Value Ref Range Status   06/29/2020 4.7 3.5 - 5.2 mmol/L Final   06/06/2018 4.4  3.5 - 5.1 mmol/L Final     Chloride   Date Value Ref Range Status   06/29/2020 102 98 - 107 mmol/L Final   06/06/2018 103 98 - 107 mmol/L Final     CO2   Date Value Ref Range Status   06/06/2018 27.0 26.0 - 30.0 mmol/L Final     Total CO2   Date Value Ref Range Status   06/29/2020 29.6 (H) 22.0 - 29.0 mmol/L Final     Calcium   Date Value Ref Range Status   06/29/2020 9.9 8.6 - 10.5 mg/dL Final   06/06/2018 10.1 8.4 - 10.2 mg/dL Final     Total Protein   Date Value Ref Range Status   06/06/2018 8.2 6.3 - 8.2 g/dL Final     Albumin   Date Value Ref Range Status   06/29/2020 5.00 3.50 - 5.20 g/dL Final   06/06/2018 4.80 3.50 - 5.00 g/dL Final     ALT (SGPT)   Date Value Ref Range Status   06/29/2020 18 1 - 41 U/L Final   06/06/2018 32 13 - 69 U/L Final     AST (SGOT)   Date Value Ref Range Status   06/29/2020 14 1 - 40 U/L Final   06/06/2018 32 15 - 46 U/L Final     Alkaline Phosphatase   Date Value Ref Range Status   06/29/2020 102 39 - 117 U/L Final   06/06/2018 94 38 - 126 U/L Final     Total Bilirubin   Date Value Ref Range Status   06/29/2020 0.3 0.2 - 1.2 mg/dL Final   06/06/2018 0.5 0.2 - 1.3 mg/dL Final     eGFR Non  Am   Date Value Ref Range Status   06/29/2020 93 >60 mL/min/1.73 Final     eGFR Non  Amer   Date Value Ref Range Status   06/06/2018 99 >60 mL/min/1.73 Final     A/G Ratio   Date Value Ref Range Status   06/29/2020 1.8 g/dL Final     BUN/Creatinine Ratio   Date Value Ref Range Status   06/29/2020 10.7 7.0 - 25.0 Final   06/06/2018 20.0 6.3 - 21.9 Final     Anion Gap   Date Value Ref Range Status   06/06/2018 17.4 10.0 - 20.0 mmol/L Final     Lab Results   Component Value Date    WBC 5.76 06/29/2020    HGB 14.6 06/29/2020    HCT 42.3 06/29/2020    MCV 93.0 06/29/2020     06/29/2020       Assessment/Plan   1. Healthy male exam.   2. Patient Counseling: Including but not Limited to the following, when appropriate:  --Nutrition: Stressed importance of moderation in sodium/caffeine  intake, saturated fat and cholesterol, caloric balance, sufficient intake of fresh fruits, vegetables, fiber  .  --Exercise: Stressed the importance of regular exercise.   --Substance Abuse: Discussed cessation/primary prevention of tobacco, alcohol, or other drug use; driving or other dangerous activities under the influence; availability of treatment for abuse, as indicated based on social history.    --Sexuality: Discussed sexually transmitted diseases, partner selection, use of condoms and contraceptive alternatives.   --Injury prevention: Discussed safety belts, safety helmets, smoke detector, smoking near bedding or upholstery.   --Dental health: Discussed importance of regular tooth brushing, flossing, and dental visits.  --Immunizations reviewed.  --Discussed benefits of colon cancer screening.  Cologuard ordered      3. Discussed the patient's BMI with him.  The BMI is in the acceptable range  4. No follow-ups on file.  5. Age-appropriate Screening Scheduled  6. There are no Patient Instructions on file for this visit.    Assessment/Plan     Diagnoses and all orders for this visit:    1. Routine general medical examination at a health care facility (Primary)

## 2022-02-23 LAB
ALBUMIN SERPL-MCNC: 4.4 G/DL (ref 3.5–5.2)
ALBUMIN/GLOB SERPL: 1.7 G/DL
ALP SERPL-CCNC: 100 U/L (ref 39–117)
ALT SERPL-CCNC: 31 U/L (ref 1–41)
AST SERPL-CCNC: 19 U/L (ref 1–40)
BILIRUB SERPL-MCNC: 0.3 MG/DL (ref 0–1.2)
BUN SERPL-MCNC: 15 MG/DL (ref 8–23)
BUN/CREAT SERPL: 20.5 (ref 7–25)
CALCIUM SERPL-MCNC: 9.8 MG/DL (ref 8.6–10.5)
CHLORIDE SERPL-SCNC: 103 MMOL/L (ref 98–107)
CHOLEST SERPL-MCNC: 236 MG/DL (ref 0–200)
CO2 SERPL-SCNC: 27.5 MMOL/L (ref 22–29)
CREAT SERPL-MCNC: 0.73 MG/DL (ref 0.76–1.27)
GLOBULIN SER CALC-MCNC: 2.6 GM/DL
GLUCOSE SERPL-MCNC: 106 MG/DL (ref 65–99)
HDLC SERPL-MCNC: 41 MG/DL (ref 40–60)
LDLC SERPL CALC-MCNC: 163 MG/DL (ref 0–100)
POTASSIUM SERPL-SCNC: 4.8 MMOL/L (ref 3.5–5.2)
PROT SERPL-MCNC: 7 G/DL (ref 6–8.5)
SODIUM SERPL-SCNC: 139 MMOL/L (ref 136–145)
TRIGL SERPL-MCNC: 172 MG/DL (ref 0–150)
VLDLC SERPL CALC-MCNC: 32 MG/DL (ref 5–40)

## 2022-05-12 ENCOUNTER — TELEPHONE (OUTPATIENT)
Dept: INTERNAL MEDICINE | Facility: CLINIC | Age: 62
End: 2022-05-12

## 2022-05-12 NOTE — TELEPHONE ENCOUNTER
Spoke with patient about completing Cologuard test. He states he has every intention on taking care of it.

## 2023-04-03 ENCOUNTER — OFFICE VISIT (OUTPATIENT)
Dept: INTERNAL MEDICINE | Facility: CLINIC | Age: 63
End: 2023-04-03
Payer: MEDICARE

## 2023-04-03 VITALS
DIASTOLIC BLOOD PRESSURE: 80 MMHG | BODY MASS INDEX: 23.19 KG/M2 | SYSTOLIC BLOOD PRESSURE: 142 MMHG | HEIGHT: 70 IN | TEMPERATURE: 97.5 F | HEART RATE: 91 BPM | WEIGHT: 162 LBS | OXYGEN SATURATION: 96 %

## 2023-04-03 DIAGNOSIS — M54.2 NECK PAIN: Primary | ICD-10-CM

## 2023-04-03 PROCEDURE — G0009 ADMIN PNEUMOCOCCAL VACCINE: HCPCS | Performed by: INTERNAL MEDICINE

## 2023-04-03 PROCEDURE — G0402 INITIAL PREVENTIVE EXAM: HCPCS | Performed by: INTERNAL MEDICINE

## 2023-04-03 PROCEDURE — 1170F FXNL STATUS ASSESSED: CPT | Performed by: INTERNAL MEDICINE

## 2023-04-03 PROCEDURE — 1160F RVW MEDS BY RX/DR IN RCRD: CPT | Performed by: INTERNAL MEDICINE

## 2023-04-03 PROCEDURE — 99397 PER PM REEVAL EST PAT 65+ YR: CPT | Performed by: INTERNAL MEDICINE

## 2023-04-03 PROCEDURE — 1159F MED LIST DOCD IN RCRD: CPT | Performed by: INTERNAL MEDICINE

## 2023-04-03 PROCEDURE — 90677 PCV20 VACCINE IM: CPT | Performed by: INTERNAL MEDICINE

## 2023-04-03 NOTE — PROGRESS NOTES
The ABCs of the Annual Wellness Visit  [place] to Medicare Visit    Subjective       Saturnino Pereyra is a 63 y.o. male who presents for a  Welcome to Medicare Visit.    The following portions of the patient's history were reviewed and   updated as appropriate: allergies, current medications, past family history, past medical history, past social history, past surgical history and problem list.     Compared to one year ago, the patient feels his physical   health is the same.    Compared to one year ago, the patient feels his mental   health is the same.    Recent Hospitalizations:  He was not admitted to the hospital during the last year.       Current Medical Providers:  Patient Care Team:  Dusty Vargas MD as PCP - General (Internal Medicine)    No outpatient medications prior to visit.     No facility-administered medications prior to visit.   Review of Systems   Constitutional: Negative.  Negative for activity change, appetite change, fatigue and fever.   HENT: Negative for congestion, ear discharge, ear pain and trouble swallowing.    Eyes: Negative for photophobia and visual disturbance.   Respiratory: Negative for cough and shortness of breath.    Cardiovascular: Negative for chest pain and palpitations.   Gastrointestinal: Negative for abdominal distention, abdominal pain, constipation, diarrhea, nausea and vomiting.   Endocrine: Negative.    Genitourinary: Negative for dysuria, hematuria and urgency.   Musculoskeletal: Positive for arthralgias. Negative for back pain, joint swelling and myalgias.   Skin: Negative for color change and rash.   Allergic/Immunologic: Negative.    Neurological: Negative for dizziness, weakness, light-headedness and headaches.   Hematological: Negative for adenopathy. Does not bruise/bleed easily.   Psychiatric/Behavioral: Negative for agitation, confusion and dysphoric mood. The patient is not nervous/anxious.        No opioid medication identified on active medication list. I  "have reviewed chart for other potential  high risk medication/s and harmful drug interactions in the elderly.          Aspirin is not on active medication list.  Aspirin use is not indicated based on review of current medical condition/s. Risk of harm outweighs potential benefits.  .    Patient Active Problem List   Diagnosis   • Mixed hyperlipidemia   • Precordial pain     Advance Care Planning  Advance Directive is not on file.  ACP discussion was held with the patient during this visit. Patient has an advance directive (not in EMR), copy requested.       Objective   Vitals:    04/03/23 1340   BP: 142/80   Pulse: 91   Temp: 97.5 °F (36.4 °C)   SpO2: 96%   Weight: 73.5 kg (162 lb)   Height: 177.8 cm (70\")   PainSc: 0-No pain     Estimated body mass index is 23.24 kg/m² as calculated from the following:    Height as of this encounter: 177.8 cm (70\").    Weight as of this encounter: 73.5 kg (162 lb).    BMI is within normal parameters. No other follow-up for BMI required.    Physical Exam  Constitutional:       General: He is not in acute distress.     Appearance: He is well-developed.   HENT:      Nose: Nose normal.   Eyes:      General: No scleral icterus.     Conjunctiva/sclera: Conjunctivae normal.   Neck:      Thyroid: No thyromegaly.      Trachea: No tracheal deviation.   Cardiovascular:      Rate and Rhythm: Normal rate and regular rhythm.      Heart sounds: No murmur heard.    No friction rub.   Pulmonary:      Effort: No respiratory distress.      Breath sounds: No wheezing or rales.   Abdominal:      General: There is no distension.      Palpations: Abdomen is soft. There is no mass.      Tenderness: There is no abdominal tenderness. There is no guarding.   Musculoskeletal:         General: No deformity. Normal range of motion.   Lymphadenopathy:      Cervical: No cervical adenopathy.   Skin:     General: Skin is warm and dry.      Findings: No erythema or rash.   Neurological:      Mental Status: He is " alert and oriented to person, place, and time.      Cranial Nerves: No cranial nerve deficit.      Coordination: Coordination normal.      Deep Tendon Reflexes: Reflexes are normal and symmetric.   Psychiatric:         Behavior: Behavior normal.         Thought Content: Thought content normal.         Judgment: Judgment normal.       Does the patient have evidence of cognitive impairment?   No         Procedures       HEALTH RISK ASSESSMENT    Smoking Status:  Social History     Tobacco Use   Smoking Status Former   • Packs/day: 0.25   • Years: 1.50   • Pack years: 0.38   • Types: Cigarettes   • Quit date: 1980   • Years since quittin.2   Smokeless Tobacco Never     Alcohol Consumption:  Social History     Substance and Sexual Activity   Alcohol Use No       Fall Risk Screen:    STEADI Fall Risk Assessment was completed, and patient is at LOW risk for falls.Assessment completed on:4/3/2023    Depression Screen:   PHQ-2/PHQ-9 Depression Screening 4/3/2023   Little Interest or Pleasure in Doing Things 0-->not at all   Feeling Down, Depressed or Hopeless 0-->not at all   PHQ-9: Brief Depression Severity Measure Score 0       Health Habits and Functional and Cognitive Screening:  Functional & Cognitive Status 4/3/2023   Do you have difficulty preparing food and eating? No   Do you have difficulty bathing yourself, getting dressed or grooming yourself? No   Do you have difficulty using the toilet? No   Do you have difficulty moving around from place to place? No   Do you have trouble with steps or getting out of a bed or a chair? No   Current Diet Well Balanced Diet   Dental Exam Up to date   Eye Exam Up to date   Exercise (times per week) 6 times per week   Current Exercises Include Walking;Yard Work;House Cleaning;Gardening   Do you need help using the phone?  No   Are you deaf or do you have serious difficulty hearing?  No   Do you need help with transportation? No   Do you need help shopping? No   Do you  need help preparing meals?  No   Do you need help with housework?  No   Do you need help with laundry? No   Do you need help taking your medications? No   Do you need help managing money? No   Do you ever drive or ride in a car without wearing a seat belt? No   Have you felt unusual stress, anger or loneliness in the last month? No   Who do you live with? Spouse   If you need help, do you have trouble finding someone available to you? No   Have you been bothered in the last four weeks by sexual problems? No   Do you have difficulty concentrating, remembering or making decisions? No       Visual Acuity:    Vision Screening    Right eye Left eye Both eyes   Without correction      With correction 20/20 20/20 20/20       Age-appropriate Screening Schedule:  Refer to the list below for future screening recommendations based on patient's age, sex and/or medical conditions. Orders for these recommended tests are listed in the plan section. The patient has been provided with a written plan.    Health Maintenance   Topic Date Due   • TDAP/TD VACCINES (1 - Tdap) Never done   • COLORECTAL CANCER SCREENING  05/22/2022   • ANNUAL PHYSICAL  02/18/2023   • LIPID PANEL  02/23/2023   • ZOSTER VACCINE (1 of 2) 02/20/2029 (Originally 4/2/2008)   • INFLUENZA VACCINE  08/01/2023   • HEPATITIS C SCREENING  Completed   • COVID-19 Vaccine  Completed   • Pneumococcal Vaccine 65+  Completed   • AAA SCREEN (ONE-TIME)  Completed        CMS Preventative Services Quick Reference  Risk Factors Identified During Encounter    Chronic Pain: Home exercise plan outlined.  OTC analgesics as needed. Proper dosing schedule discussed.   The above risks/problems have been discussed with the patient.  Pertinent information has been shared with the patient in the After Visit Summary.  Follow up plans and orders are seen below in the Assessment/Plan Section.    Diagnoses and all orders for this visit:    1. Neck pain (Primary) suspect DJD discussed home  exercises NSAIDs as needed only will hold off on imaging studies    Other orders  -     Pneumococcal Conjugate Vaccine 20-Valent (PCV20)        Follow Up:   Initial Medicare Visit in one year    An After Visit Summary and PPPS were made available to the patient.

## 2024-08-16 ENCOUNTER — OFFICE VISIT (OUTPATIENT)
Dept: INTERNAL MEDICINE | Facility: CLINIC | Age: 64
End: 2024-08-16
Payer: MEDICARE

## 2024-08-16 ENCOUNTER — HOSPITAL ENCOUNTER (OUTPATIENT)
Dept: GENERAL RADIOLOGY | Facility: HOSPITAL | Age: 64
Discharge: HOME | End: 2024-08-16
Payer: MEDICARE

## 2024-08-16 VITALS
TEMPERATURE: 97.3 F | HEART RATE: 70 BPM | RESPIRATION RATE: 16 BRPM | WEIGHT: 155 LBS | OXYGEN SATURATION: 100 % | SYSTOLIC BLOOD PRESSURE: 152 MMHG | HEIGHT: 70 IN | DIASTOLIC BLOOD PRESSURE: 86 MMHG | BODY MASS INDEX: 22.19 KG/M2

## 2024-08-16 DIAGNOSIS — R73.9 HYPERGLYCEMIA: ICD-10-CM

## 2024-08-16 DIAGNOSIS — R03.0 ELEVATED BP WITHOUT DIAGNOSIS OF HYPERTENSION: ICD-10-CM

## 2024-08-16 DIAGNOSIS — E78.2 MIXED HYPERLIPIDEMIA: Primary | ICD-10-CM

## 2024-08-16 DIAGNOSIS — M25.511 CHRONIC PAIN OF BOTH SHOULDERS: ICD-10-CM

## 2024-08-16 DIAGNOSIS — M79.10 MYALGIA: ICD-10-CM

## 2024-08-16 DIAGNOSIS — M25.512 CHRONIC PAIN OF BOTH SHOULDERS: ICD-10-CM

## 2024-08-16 DIAGNOSIS — M25.552 PAIN OF LEFT HIP: ICD-10-CM

## 2024-08-16 DIAGNOSIS — G89.29 CHRONIC PAIN OF BOTH SHOULDERS: ICD-10-CM

## 2024-08-16 PROCEDURE — 73502 X-RAY EXAM HIP UNI 2-3 VIEWS: CPT

## 2024-08-16 RX ORDER — TRAZODONE HYDROCHLORIDE 50 MG/1
50 TABLET ORAL NIGHTLY
Qty: 30 TABLET | Refills: 5 | Status: SHIPPED | OUTPATIENT
Start: 2024-08-16

## 2024-08-16 NOTE — PROGRESS NOTES
" Subjective   The ABCs of the Annual Wellness Visit  Medicare Wellness Visit      Saturnino Pereyra is a 64 y.o. patient who presents for a Medicare Wellness Visit.    The following portions of the patient's history were reviewed and   updated as appropriate: allergies, current medications, past family history, past medical history, past social history, past surgical history, and problem list.    Compared to one year ago, the patient's physical   health is worse.  Compared to one year ago, the patient's mental   health is worse.    Recent Hospitalizations:  He was not admitted to the hospital during the last year.     Current Medical Providers:  Patient Care Team:  Dusty Vargas MD as PCP - General (Internal Medicine)    No outpatient medications prior to visit.     No facility-administered medications prior to visit.     No opioid medication identified on active medication list. I have reviewed chart for other potential  high risk medication/s and harmful drug interactions in the elderly.      Aspirin is not on active medication list.  Aspirin use is not indicated based on review of current medical condition/s. Risk of harm outweighs potential benefits.  .    Patient Active Problem List   Diagnosis    Mixed hyperlipidemia    Precordial pain     Advance Care Planning Advance Directive is on file.  ACP discussion was held with the patient during this visit. Patient has an advance directive in EMR which is still valid.             Objective   Vitals:    08/16/24 1057   BP: 152/86   Pulse: 70   Resp: 16   Temp: 97.3 °F (36.3 °C)   SpO2: 100%   Weight: 70.3 kg (155 lb)   Height: 177.8 cm (70\")   PainSc: 0-No pain       Estimated body mass index is 22.24 kg/m² as calculated from the following:    Height as of this encounter: 177.8 cm (70\").    Weight as of this encounter: 70.3 kg (155 lb).    BMI is within normal parameters. No other follow-up for BMI required.       Does the patient have evidence of cognitive impairment? " No                                                                                                Health  Risk Assessment    Smoking Status:  Social History     Tobacco Use   Smoking Status Former    Current packs/day: 0.00    Average packs/day: 0.3 packs/day for 1.5 years (0.4 ttl pk-yrs)    Types: Cigarettes    Start date: 1978    Quit date: 1980    Years since quittin.6   Smokeless Tobacco Never     Alcohol Consumption:  Social History     Substance and Sexual Activity   Alcohol Use No       Fall Risk Screen  STEADI Fall Risk Assessment was completed, and patient is at LOW risk for falls.Assessment completed on:2024    Depression Screenin/16/2024    10:57 AM   PHQ-2/PHQ-9 Depression Screening   Little Interest or Pleasure in Doing Things 0-->not at all   Feeling Down, Depressed or Hopeless 0-->not at all   PHQ-9: Brief Depression Severity Measure Score 0     Health Habits and Functional and Cognitive Screenin/16/2024    10:55 AM   Functional & Cognitive Status   Do you have difficulty preparing food and eating? No   Do you have difficulty bathing yourself, getting dressed or grooming yourself? No   Do you have difficulty using the toilet? No   Do you have difficulty moving around from place to place? No   Do you have trouble with steps or getting out of a bed or a chair? No   Current Diet Unhealthy Diet   Dental Exam Up to date   Eye Exam Up to date   Exercise (times per week) 7 times per week   Current Exercises Include Walking;Gardening;Yard Work   Do you need help using the phone?  No   Are you deaf or do you have serious difficulty hearing?  No   Do you need help to go to places out of walking distance? No   Do you need help shopping? No   Do you need help preparing meals?  No   Do you need help with housework?  No   Do you need help with laundry? No   Do you need help taking your medications? No   Do you need help managing money? No   Do you ever drive or ride in a car  without wearing a seat belt? No   Have you felt unusual stress, anger or loneliness in the last month? No   Who do you live with? Spouse   If you need help, do you have trouble finding someone available to you? No   Have you been bothered in the last four weeks by sexual problems? No   Do you have difficulty concentrating, remembering or making decisions? No           Age-appropriate Screening Schedule:  Refer to the list below for future screening recommendations based on patient's age, sex and/or medical conditions. Orders for these recommended tests are listed in the plan section. The patient has been provided with a written plan.    Health Maintenance List  Health Maintenance   Topic Date Due    TDAP/TD VACCINES (1 - Tdap) Never done    LIPID PANEL  02/23/2023    COVID-19 Vaccine (7 - 2023-24 season) 03/17/2024    ANNUAL WELLNESS VISIT  04/03/2024    INFLUENZA VACCINE  08/01/2024    ZOSTER VACCINE (1 of 2) 02/20/2029 (Originally 4/2/2008)    COLORECTAL CANCER SCREENING  05/21/2025    HEPATITIS C SCREENING  Completed    Pneumococcal Vaccine 65+  Completed    AAA SCREEN (ONE-TIME)  Completed                                                                                                                                                CMS Preventative Services Quick Reference  Risk Factors Identified During Encounter  Chronic Pain: OTC analgesics as needed. Proper dosing schedule discussed.   Rheumatology referral    The above risks/problems have been discussed with the patient.  Pertinent information has been shared with the patient in the After Visit Summary.  An After Visit Summary and PPPS were made available to the patient.    Follow Up:   Next Medicare Wellness visit to be scheduled in 1 year.         Additional E&M Note during same encounter follows:  Patient has additional, significant, and separately identifiable condition(s)/problem(s) that require work above and beyond the Medicare Wellness Visit     Chief  "Complaint  Medicare Wellness-subsequent    Subjective   HPI  Saturnino is also being seen today for additional medical problem/s.  Chronic bilateral shoulder discomfort along with lower neck and back pain.  Complains of myalgias and stiffness in his muscles.  Has severe left-sided hip pain without recent trauma this is ongoing for more than 6 months.  Pain worse after he finishes yard work  Review of Systems   Constitutional:  Positive for fatigue. Negative for activity change, appetite change and fever.   HENT:  Negative for congestion, ear discharge, ear pain and trouble swallowing.    Eyes:  Negative for photophobia and visual disturbance.   Respiratory:  Negative for cough and shortness of breath.    Cardiovascular:  Negative for chest pain and palpitations.   Gastrointestinal:  Negative for abdominal distention, constipation, diarrhea, nausea and vomiting.   Genitourinary:  Negative for dysuria, hematuria and urgency.   Musculoskeletal:  Positive for arthralgias, myalgias and neck stiffness. Negative for back pain and joint swelling.   Skin:  Negative for color change and rash.   Neurological:  Negative for dizziness, weakness, light-headedness and confusion.   Hematological:  Negative for adenopathy. Does not bruise/bleed easily.   Psychiatric/Behavioral:  Positive for sleep disturbance. Negative for agitation and dysphoric mood. The patient is not nervous/anxious.               Objective   Vital Signs:  /86   Pulse 70   Temp 97.3 °F (36.3 °C)   Resp 16   Ht 177.8 cm (70\")   Wt 70.3 kg (155 lb)   SpO2 100%   BMI 22.24 kg/m²   Physical Exam  Constitutional:       General: He is not in acute distress.     Appearance: He is well-developed.   HENT:      Nose: Nose normal.   Eyes:      General: No scleral icterus.     Conjunctiva/sclera: Conjunctivae normal.   Neck:      Thyroid: No thyromegaly.      Trachea: No tracheal deviation.   Cardiovascular:      Rate and Rhythm: Normal rate and regular rhythm.      " Heart sounds: No murmur heard.     No friction rub.   Pulmonary:      Effort: No respiratory distress.      Breath sounds: No wheezing or rales.   Abdominal:      General: There is no distension.      Palpations: Abdomen is soft. There is no mass.      Tenderness: There is no abdominal tenderness. There is no guarding.   Musculoskeletal:         General: Tenderness present. No deformity. Normal range of motion.      Comments: Pain with rotation of the left hip joint   Lymphadenopathy:      Cervical: No cervical adenopathy.   Skin:     General: Skin is warm and dry.      Findings: No erythema or rash.   Neurological:      Mental Status: He is alert and oriented to person, place, and time.      Cranial Nerves: No cranial nerve deficit.      Coordination: Coordination normal.      Deep Tendon Reflexes: Reflexes are normal and symmetric.   Psychiatric:         Behavior: Behavior normal.         Thought Content: Thought content normal.         Judgment: Judgment normal.                 Assessment and Plan               Mixed hyperlipidemia  Discussed dietary restrictions check lipid profile  Elevated BP without diagnosis of hypertension  Readings at home show good control we will ask patient to follow readings at home he is to report those back again in a month consider antihypertensive meds if elevated  Chronic pain of both shoulders  Workup in the past to rule out polymyalgia rheumatica.  Has associated myalgias.  Lab work at that time was negative repeat labs again  Hyperglycemia  Check A1c  Pain of left hip  Suspect DJD check x-ray NSAIDs as needed for now  Myalgia  Lab work.  Multiple joint pains and muscle aches referral to rheumatology  No orders of the defined types were placed in this encounter.            Follow Up   No follow-ups on file.  Patient was given instructions and counseling regarding his condition or for health maintenance advice. Please see specific information pulled into the AVS if appropriate.

## 2024-08-19 ENCOUNTER — LAB (OUTPATIENT)
Dept: LAB | Facility: HOSPITAL | Age: 64
End: 2024-08-19
Payer: MEDICARE

## 2024-08-19 LAB
ALBUMIN SERPL-MCNC: 4.6 G/DL (ref 3.5–5.2)
ALBUMIN/GLOB SERPL: 1.6 G/DL
ALP SERPL-CCNC: 112 U/L (ref 39–117)
ALT SERPL W P-5'-P-CCNC: 18 U/L (ref 1–41)
ANION GAP SERPL CALCULATED.3IONS-SCNC: 10 MMOL/L (ref 5–15)
AST SERPL-CCNC: 24 U/L (ref 1–40)
BILIRUB SERPL-MCNC: 0.3 MG/DL (ref 0–1.2)
BUN SERPL-MCNC: 11 MG/DL (ref 8–23)
BUN/CREAT SERPL: 13.6 (ref 7–25)
CALCIUM SPEC-SCNC: 9.9 MG/DL (ref 8.6–10.5)
CHLORIDE SERPL-SCNC: 102 MMOL/L (ref 98–107)
CHOLEST SERPL-MCNC: 235 MG/DL (ref 0–200)
CK SERPL-CCNC: 52 U/L (ref 20–200)
CO2 SERPL-SCNC: 27 MMOL/L (ref 22–29)
CREAT SERPL-MCNC: 0.81 MG/DL (ref 0.76–1.27)
CRP SERPL-MCNC: <0.3 MG/DL (ref 0–0.5)
DEPRECATED RDW RBC AUTO: 42.1 FL (ref 37–54)
EGFRCR SERPLBLD CKD-EPI 2021: 97.2 ML/MIN/1.73
ERYTHROCYTE [DISTWIDTH] IN BLOOD BY AUTOMATED COUNT: 12.5 % (ref 12.3–15.4)
GLOBULIN UR ELPH-MCNC: 2.8 GM/DL
GLUCOSE SERPL-MCNC: 89 MG/DL (ref 65–99)
HBA1C MFR BLD: 5.9 % (ref 4.8–5.6)
HCT VFR BLD AUTO: 44.8 % (ref 37.5–51)
HDLC SERPL-MCNC: 44 MG/DL (ref 40–60)
HGB BLD-MCNC: 15.5 G/DL (ref 13–17.7)
LDLC SERPL CALC-MCNC: 143 MG/DL (ref 0–100)
LDLC/HDLC SERPL: 3.13 {RATIO}
MCH RBC QN AUTO: 31.8 PG (ref 26.6–33)
MCHC RBC AUTO-ENTMCNC: 34.6 G/DL (ref 31.5–35.7)
MCV RBC AUTO: 91.8 FL (ref 79–97)
PLATELET # BLD AUTO: 281 10*3/MM3 (ref 140–450)
PMV BLD AUTO: 10.6 FL (ref 6–12)
POTASSIUM SERPL-SCNC: 4.5 MMOL/L (ref 3.5–5.2)
PROT SERPL-MCNC: 7.4 G/DL (ref 6–8.5)
RBC # BLD AUTO: 4.88 10*6/MM3 (ref 4.14–5.8)
SODIUM SERPL-SCNC: 139 MMOL/L (ref 136–145)
TRIGL SERPL-MCNC: 266 MG/DL (ref 0–150)
URATE SERPL-MCNC: 5.6 MG/DL (ref 3.4–7)
VLDLC SERPL-MCNC: 48 MG/DL (ref 5–40)
WBC NRBC COR # BLD AUTO: 6.51 10*3/MM3 (ref 3.4–10.8)

## 2024-08-19 PROCEDURE — 86618 LYME DISEASE ANTIBODY: CPT | Performed by: INTERNAL MEDICINE

## 2024-08-19 PROCEDURE — 82550 ASSAY OF CK (CPK): CPT | Performed by: INTERNAL MEDICINE

## 2024-08-19 PROCEDURE — 83036 HEMOGLOBIN GLYCOSYLATED A1C: CPT | Performed by: INTERNAL MEDICINE

## 2024-08-19 PROCEDURE — 86140 C-REACTIVE PROTEIN: CPT | Performed by: INTERNAL MEDICINE

## 2024-08-19 PROCEDURE — 85027 COMPLETE CBC AUTOMATED: CPT | Performed by: INTERNAL MEDICINE

## 2024-08-19 PROCEDURE — 80061 LIPID PANEL: CPT | Performed by: INTERNAL MEDICINE

## 2024-08-19 PROCEDURE — 84550 ASSAY OF BLOOD/URIC ACID: CPT | Performed by: INTERNAL MEDICINE

## 2024-08-19 PROCEDURE — 80053 COMPREHEN METABOLIC PANEL: CPT | Performed by: INTERNAL MEDICINE

## 2024-08-20 LAB — B BURGDOR IGG+IGM SER QL IA: NEGATIVE

## 2024-08-27 ENCOUNTER — TELEPHONE (OUTPATIENT)
Dept: INTERNAL MEDICINE | Facility: CLINIC | Age: 64
End: 2024-08-27
Payer: MEDICARE

## 2024-10-04 ENCOUNTER — OFFICE VISIT (OUTPATIENT)
Dept: INTERNAL MEDICINE | Facility: CLINIC | Age: 64
End: 2024-10-04
Payer: MEDICARE

## 2024-10-04 VITALS
SYSTOLIC BLOOD PRESSURE: 128 MMHG | BODY MASS INDEX: 21.62 KG/M2 | WEIGHT: 146 LBS | HEART RATE: 74 BPM | OXYGEN SATURATION: 98 % | HEIGHT: 69 IN | TEMPERATURE: 97.7 F | DIASTOLIC BLOOD PRESSURE: 72 MMHG | RESPIRATION RATE: 16 BRPM

## 2024-10-04 DIAGNOSIS — M25.512 CHRONIC PAIN OF BOTH SHOULDERS: Primary | ICD-10-CM

## 2024-10-04 DIAGNOSIS — R73.9 HYPERGLYCEMIA: ICD-10-CM

## 2024-10-04 DIAGNOSIS — M25.511 CHRONIC PAIN OF BOTH SHOULDERS: Primary | ICD-10-CM

## 2024-10-04 DIAGNOSIS — G89.29 CHRONIC PAIN OF BOTH SHOULDERS: Primary | ICD-10-CM

## 2024-10-04 PROCEDURE — 1125F AMNT PAIN NOTED PAIN PRSNT: CPT | Performed by: INTERNAL MEDICINE

## 2024-10-04 PROCEDURE — G2211 COMPLEX E/M VISIT ADD ON: HCPCS | Performed by: INTERNAL MEDICINE

## 2024-10-04 PROCEDURE — 99213 OFFICE O/P EST LOW 20 MIN: CPT | Performed by: INTERNAL MEDICINE

## 2024-10-04 NOTE — PROGRESS NOTES
Subjective  Saturnino Pereyra is a 64 y.o. male    HPI coming in for follow-up he was seen about a month ago with complaints of severe body aches and joint pains especially with shoulder discomfort with overhead activities.  He has tried NSAIDs without much relief.  At the time of his last visit was noted to have an elevated BP.  However since he has had reasonably good BP control at home it was advised for him to follow readings at home and to come back for follow-up again in a month with home readings.  He underwent workup with lab tests showing mildly elevated A1c.  Other lab work included a CPK level uric acid level C-reactive protein CMP CBC all essentially unremarkable a Lyme disease titer was unremarkable.  Rheumatology referral was made his appointment is in December.  The following portions of the patient's history were reviewed and updated as appropriate: allergies, current medications, past family history, past medical history, past social history, past surgical history, and problem list.     Review of Systems   Constitutional: Negative.  Negative for activity change, appetite change, fatigue and fever.   HENT:  Negative for congestion, ear discharge, ear pain and trouble swallowing.    Eyes:  Negative for photophobia and visual disturbance.   Respiratory:  Negative for cough and shortness of breath.    Cardiovascular:  Negative for chest pain and palpitations.   Gastrointestinal:  Negative for abdominal distention, abdominal pain, constipation, diarrhea, nausea and vomiting.   Endocrine: Negative.    Genitourinary:  Negative for dysuria, hematuria and urgency.   Musculoskeletal:  Positive for arthralgias and myalgias. Negative for back pain and joint swelling.   Skin:  Negative for color change and rash.   Allergic/Immunologic: Negative.    Neurological:  Negative for dizziness, weakness, light-headedness and headaches.   Hematological:  Negative for adenopathy. Does not bruise/bleed easily.  "  Psychiatric/Behavioral:  Positive for sleep disturbance. Negative for agitation, confusion and dysphoric mood. The patient is not nervous/anxious.        Visit Vitals  /72   Pulse 74   Temp 97.7 °F (36.5 °C)   Resp 16   Ht 175.3 cm (69\")   Wt 66.2 kg (146 lb)   SpO2 98%   BMI 21.56 kg/m²       Objective  Physical Exam  Constitutional:       General: He is not in acute distress.     Appearance: He is well-developed.   HENT:      Nose: Nose normal.   Eyes:      General: No scleral icterus.     Conjunctiva/sclera: Conjunctivae normal.   Neck:      Thyroid: No thyromegaly.      Trachea: No tracheal deviation.   Cardiovascular:      Rate and Rhythm: Normal rate and regular rhythm.      Heart sounds: No murmur heard.     No friction rub.   Pulmonary:      Effort: No respiratory distress.      Breath sounds: No wheezing or rales.   Abdominal:      General: There is no distension.      Palpations: Abdomen is soft. There is no mass.      Tenderness: There is no abdominal tenderness. There is no guarding.   Musculoskeletal:         General: No deformity. Normal range of motion.      Comments: Some discomfort on abduction of the shoulder joint bilaterally.   Lymphadenopathy:      Cervical: No cervical adenopathy.   Skin:     General: Skin is warm and dry.      Findings: No erythema or rash.   Neurological:      Mental Status: He is alert and oriented to person, place, and time.      Cranial Nerves: No cranial nerve deficit.      Coordination: Coordination normal.      Deep Tendon Reflexes: Reflexes are normal and symmetric.   Psychiatric:         Behavior: Behavior normal.         Thought Content: Thought content normal.         Judgment: Judgment normal.       Diagnoses and all orders for this visit:    Chronic pain of both shoulders he was given Thera-Band exercises at his last visit for suspected rotator cuff disease.  This has not helped him much.  I have reviewed labs with him again.  They are not indicative of " polymyalgia rheumatica.  Discussed normal CPK level in view of his history of myalgias.  He was given a prescription of trazodone to see if that helps with suspected fibromyalgia symptoms.  He tried 2 doses however it caused some stuffiness in his nose and he stopped taking it it did help him with his sleep though.  Brochure with other shoulder exercises given to the patient.  Shoulder joint exam without evidence of crepitus.  Doubt DJD.  Discussed holding off on shoulder x-rays he is agreeable with that.  Hyperglycemia.  Mild elevation noted the patient has himself cut down on sugar and creamer.  Has had some weight loss.  Denies polyuria polydipsia.  Will continue to monitor this  Copies of his labs were given to the patient        BMI is within normal parameters. No other follow-up for BMI required.      Answers submitted by the patient for this visit:  Other (Submitted on 10/3/2024)  Please describe your symptoms.: Follow up for previous visit  Have you had these symptoms before?: Yes  How long have you been having these symptoms?: Greater than 2 weeks  Please list any medications you are currently taking for this condition.: none  Primary Reason for Visit (Submitted on 10/3/2024)  What is the primary reason for your visit?: Problem Not Listed

## 2024-12-17 ENCOUNTER — OFFICE VISIT (OUTPATIENT)
Age: 64
End: 2024-12-17
Payer: MEDICARE

## 2024-12-17 VITALS
BODY MASS INDEX: 21.19 KG/M2 | WEIGHT: 148 LBS | HEART RATE: 50 BPM | TEMPERATURE: 97.6 F | SYSTOLIC BLOOD PRESSURE: 120 MMHG | DIASTOLIC BLOOD PRESSURE: 70 MMHG | HEIGHT: 70 IN

## 2024-12-17 DIAGNOSIS — R53.83 FATIGUE, UNSPECIFIED TYPE: ICD-10-CM

## 2024-12-17 DIAGNOSIS — M25.50 ARTHRALGIA, UNSPECIFIED JOINT: Primary | ICD-10-CM

## 2024-12-17 DIAGNOSIS — M15.0 PRIMARY OSTEOARTHRITIS INVOLVING MULTIPLE JOINTS: Chronic | ICD-10-CM

## 2024-12-17 NOTE — ASSESSMENT & PLAN NOTE
Tylenol PRN is ok as directed   He has taken some ibuprofen PRN   He has seen neurosurgery   He has had back surgery in the 1990's  He has tried a heating pad   Orders:    14.3.3 ETA, Rheum. Arthritis; Future    GABRIEL 12 Plus Profile (RDL); Future    GABRIEL by IFA, Reflex to Titer and Pattern; Future    ANCA Panel; Future    Beta-2 Glycoprotein Antibodies; Future    CBC Auto Differential; Future    CK; Future    C-reactive Protein; Future    Comprehensive Metabolic Panel; Future    Cyclic Citrul Peptide Antibody, IgG / IgA; Future    HLA-B27 Antigen; Future    RF Isotypes, IgG, IgA, IgM EIA; Future    Sedimentation Rate; Future    TSH+Free T4; Future    Thyroid Antibodies; Future    Urinalysis With Culture If Indicated -; Future    Aldolase; Future    QuantiFERON-TB Gold Plus; Future    Hepatitis Panel, Acute; Future    XR Spine Cervical 3 View; Future    XR Spine Lumbar 2 or 3 View; Future    XR Shoulder 2+ View Bilateral; Future    XR Chest 2 View; Future

## 2024-12-17 NOTE — PROGRESS NOTES
Office Visit       Date: 12/17/2024   Patient Name: Saturnino Pereyra  MRN: [id number]  YOB: 1960    Referring Physician: Dusty Vargas MD     Chief Complaint   Patient presents with    Joint Pain    Osteoarthritis       History of Present Illness: Saturnino Pereyra is a 64 y.o. male who is here today at the request of Dr. Dusty Vargas. The referral indicates that he has chronic shoulder pain. No recent serious injuries or infections. No fever. He is fatigued. No history of gout, psoriasis, or Raynaud's. No history of uveitis, iritis, or scleritis. No oral, nasal, or genital ulcers.     Today he rates his pain as 3/10 in severity. He has 20-30 minutes/day of morning stiffness. No red or hot joints. No swelling. He has back and neck pain. No muscle pain or weakness.     No sicca symptoms. No shortness of breath. No chest pain. No GI issues. He has urinary urgency. No pain with urination. No headaches or paresthesias. No vision loss/changes. No jaw pain. No lymphadenopathy. No abnormal bruising/bleeding. No rash.    He does some physical work/labor. He thinks his work may cause some discomfort/exacerbate his pain. The left side of his body hurts worse than his right. He has good days and bad. His pain moves around.  He reports weight loss. He lost 10 lbs this past year.     Medication/treatment/interventions tried include: Tylenol, he had back surgery, Trazodone, ibuprofen, He has seen neurosurgery, heating pad   Studies reviewed included:   8/19/24: CPK normal, CBC was fine, CMP was ok, CRP normal, Uric acid 5.6, Lyme negative        XR HIP W OR WO PELVIS 2-3 VIEW LEFT     Date of Exam: 8/16/2024 11:47 AM EDT     Indication: pain     Comparison: None available.     Findings:  AP pelvis, AP and frog-lateral views of the left hip were obtained. There is no fracture or dislocation. There is mild narrowing of the left hip joint        There is an elongated right L5 transverse process.      IMPRESSION:  Impression:  Mild degenerative changes of the left hip. No acute osseous abnormality.    Subjective     Review of Systems   Constitutional:  Positive for fatigue and unexpected weight loss.   HENT: Negative.     Eyes: Negative.    Respiratory: Negative.     Cardiovascular: Negative.    Gastrointestinal: Negative.    Endocrine: Negative.    Genitourinary:  Positive for urgency.   Musculoskeletal:  Positive for arthralgias, back pain and neck pain.   Skin: Negative.    Allergic/Immunologic: Negative.    Neurological: Negative.    Hematological: Negative.    Psychiatric/Behavioral: Negative.     All other systems reviewed and are negative.       Past Medical History:   Diagnosis Date    Arthritis     Heart murmur     Hyperlipidemia        Past Surgical History:   Procedure Laterality Date    BACK SURGERY Bilateral 1999    CARDIAC CATHETERIZATION N/A 2018    Procedure: Coronary angiography;  Surgeon: Peter Fragoso MD;  Location: [facility] INVASIVE LOC[facility];  Service: Cardiovascular    EYE MUSCLE SURGERY Left 1967    EYE MUSCLE SURGERY Bilateral 1963    SPINE SURGERY         Family History   Problem Relation Age of Onset    Heart disease Mother     Atrial fibrillation Mother     Hypotension Mother     Arthritis Mother     Arrhythmia Mother         afib on Tikosyn    Heart disease Father     Heart attack Paternal Grandfather        Social History     Socioeconomic History    Marital status:    Tobacco Use    Smoking status: Former     Current packs/day: 0.00     Average packs/day: 0.3 packs/day for 1.5 years (0.4 ttl pk-yrs)     Types: Cigarettes     Start date: 1978     Quit date: 1980     Years since quittin.9    Smokeless tobacco: Never   Vaping Use    Vaping status: Never Used   Substance and Sexual Activity    Alcohol use: No    Drug use: No    Sexual activity: Defer       No current outpatient medications on file.    Allergies   Allergen Reactions    Lipitor  "[Atorvastatin] Myalgia       I reviewed the patient's chief complaint, history of present illness, review of systems, past medical history, surgical history, family history, social history, medications and allergy list.     Objective      Vitals:    12/17/24 0857   BP: 120/70   BP Location: Left arm   Pulse: 50   Temp: 97.6 °F (36.4 °C)   Weight: 67.1 kg (148 lb)   Height: 177.8 cm (70\")   PainSc:   3     Body mass index is 21.24 kg/m².       Physical Exam       General: Well appearing 64 year old  female. Not in distress. She is ambulating unassisted.   SKIN: No rashes. No alopecia. No subcutaneous nodules. No digital pits or ulcers. No sclerodactyly.   HEENT: NCAT. Conjunctiva clear, no photophobia. No oral or nasal ulcers. Hearing intact.    Pulmonary: Clear to auscultation bilaterally. No wheezing, rales, or rhonchi.  CV: Regular rate and rhythm. No murmurs, rubs, or gallops.   Psych: Normal mood and affect. Alert and oriented x 3.   Extremities: No cyanosis or edema.   Musculoskeletal: No joint swelling or tenderness to palpation. No warmth or erythema. Normal range of motion of the wrists, ankles, elbows, and knees. He has limited motion in rotation of the cervical spine to the right.   Lymph: No palpable cervical adenopathy      Procedures    Assessment / Plan      Assessment & Plan  Arthralgia, unspecified joint  At age 64 he most likely is going to have some osteoarthritis.  See below  We will evaluate him further for RA/autoimmune disease/inflammatory types of arthritis  PMR would be on our differential here.   We gave him a handout on OA to take home and review   Follow up in 2 months   Orders:    14.3.3 ETA, Rheum. Arthritis; Future    GABRIEL 12 Plus Profile (RDL); Future    GABRIEL by IFA, Reflex to Titer and Pattern; Future    ANCA Panel; Future    Beta-2 Glycoprotein Antibodies; Future    CBC Auto Differential; Future    CK; Future    C-reactive Protein; Future    Comprehensive Metabolic Panel; " Future    Cyclic Citrul Peptide Antibody, IgG / IgA; Future    HLA-B27 Antigen; Future    RF Isotypes, IgG, IgA, IgM EIA; Future    Sedimentation Rate; Future    TSH+Free T4; Future    Thyroid Antibodies; Future    Urinalysis With Culture If Indicated -; Future    Aldolase; Future    QuantiFERON-TB Gold Plus; Future    Hepatitis Panel, Acute; Future    XR Spine Cervical 3 View; Future    XR Spine Lumbar 2 or 3 View; Future    XR Shoulder 2+ View Bilateral; Future    XR Chest 2 View; Future    Fatigue, unspecified type    Labs and x-rays to evaluate for autoimmune conditions. We usually contact patients with these results within 10-14 business days    Orders:    14.3.3 ETA, Rheum. Arthritis; Future    GABRIEL 12 Plus Profile (RDL); Future    GABRIEL by IFA, Reflex to Titer and Pattern; Future    ANCA Panel; Future    Beta-2 Glycoprotein Antibodies; Future    CBC Auto Differential; Future    CK; Future    C-reactive Protein; Future    Comprehensive Metabolic Panel; Future    Cyclic Citrul Peptide Antibody, IgG / IgA; Future    HLA-B27 Antigen; Future    RF Isotypes, IgG, IgA, IgM EIA; Future    Sedimentation Rate; Future    TSH+Free T4; Future    Thyroid Antibodies; Future    Urinalysis With Culture If Indicated -; Future    Aldolase; Future    QuantiFERON-TB Gold Plus; Future    Hepatitis Panel, Acute; Future    XR Spine Cervical 3 View; Future    XR Spine Lumbar 2 or 3 View; Future    XR Shoulder 2+ View Bilateral; Future    XR Chest 2 View; Future    Primary osteoarthritis involving multiple joints  Tylenol PRN is ok as directed   He has taken some ibuprofen PRN   He has seen neurosurgery   He has had back surgery in the 1990's  He has tried a heating pad   Orders:    14.3.3 ETA, Rheum. Arthritis; Future    GABRIEL 12 Plus Profile (RDL); Future    GABRIEL by IFA, Reflex to Titer and Pattern; Future    ANCA Panel; Future    Beta-2 Glycoprotein Antibodies; Future    CBC Auto Differential; Future    CK; Future    C-reactive Protein;  Future    Comprehensive Metabolic Panel; Future    Cyclic Citrul Peptide Antibody, IgG / IgA; Future    HLA-B27 Antigen; Future    RF Isotypes, IgG, IgA, IgM EIA; Future    Sedimentation Rate; Future    TSH+Free T4; Future    Thyroid Antibodies; Future    Urinalysis With Culture If Indicated -; Future    Aldolase; Future    QuantiFERON-TB Gold Plus; Future    Hepatitis Panel, Acute; Future    XR Spine Cervical 3 View; Future    XR Spine Lumbar 2 or 3 View; Future    XR Shoulder 2+ View Bilateral; Future    XR Chest 2 View; Future      Follow Up:   Return in about 2 months (around 2/17/2025).    Marquis Salvador DO  [facility]

## 2024-12-24 ENCOUNTER — SPECIALTY PHARMACY (OUTPATIENT)
Age: 64
End: 2024-12-24
Payer: MEDICARE

## 2025-03-27 ENCOUNTER — CLINICAL SUPPORT (OUTPATIENT)
Dept: INTERNAL MEDICINE | Facility: CLINIC | Age: 65
End: 2025-03-27
Payer: MEDICARE

## 2025-03-27 VITALS — SYSTOLIC BLOOD PRESSURE: 145 MMHG | DIASTOLIC BLOOD PRESSURE: 64 MMHG

## 2025-03-27 DIAGNOSIS — I10 HYPERTENSION, UNSPECIFIED TYPE: Primary | ICD-10-CM

## 2025-04-07 ENCOUNTER — OFFICE VISIT (OUTPATIENT)
Dept: INTERNAL MEDICINE | Facility: CLINIC | Age: 65
End: 2025-04-07
Payer: MEDICARE

## 2025-04-07 ENCOUNTER — TELEPHONE (OUTPATIENT)
Age: 65
End: 2025-04-07
Payer: MEDICARE

## 2025-04-07 VITALS
BODY MASS INDEX: 21.19 KG/M2 | DIASTOLIC BLOOD PRESSURE: 80 MMHG | HEART RATE: 74 BPM | WEIGHT: 148 LBS | TEMPERATURE: 98 F | SYSTOLIC BLOOD PRESSURE: 144 MMHG | HEIGHT: 70 IN | RESPIRATION RATE: 18 BRPM | OXYGEN SATURATION: 97 %

## 2025-04-07 DIAGNOSIS — R00.2 PALPITATION: Primary | ICD-10-CM

## 2025-04-07 NOTE — PROGRESS NOTES
Subjective  Saturnino Pereyra is a 65 y.o. male    HPI coming in with about 6-week history of intermittent sensation of a skipped beat.  He does not have associated lightheadedness dizziness or chest pain.  He feels anxious about this.  Somewhat similar symptoms about 7 years ago underwent a thorough workup with with a stress test Holter monitor cardiac cath and an echocardiogram all of which were essentially unremarkable.  He denies significant caffeine use.  Overall fairly active denies sleep disturbances no alcohol or tobacco use    The following portions of the patient's history were reviewed and updated as appropriate: allergies, current medications, past family history, past medical history, past social history, past surgical history, and problem list.     Review of Systems   Constitutional:  Positive for fatigue. Negative for activity change, appetite change, chills, diaphoresis and fever.   HENT:  Negative for congestion, ear discharge, ear pain, sore throat and trouble swallowing.    Eyes:  Negative for photophobia and visual disturbance.   Respiratory:  Negative for cough and shortness of breath.    Cardiovascular:  Negative for chest pain and palpitations.   Gastrointestinal:  Negative for abdominal distention, abdominal pain, constipation, diarrhea, nausea and vomiting.   Endocrine: Negative.    Genitourinary:  Negative for dysuria, hematuria and urgency.   Musculoskeletal:  Positive for arthralgias. Negative for back pain, joint swelling, myalgias and neck pain.   Skin:  Negative for color change and rash.   Allergic/Immunologic: Negative.    Neurological:  Positive for weakness. Negative for dizziness, light-headedness, numbness and headaches.   Hematological:  Negative for adenopathy. Does not bruise/bleed easily.   Psychiatric/Behavioral:  Negative for agitation, confusion and dysphoric mood. The patient is not nervous/anxious.        Visit Vitals  /80   Pulse 74   Temp 98 °F (36.7 °C)   Resp 18  "  Ht 177.8 cm (70\")   Wt 67.1 kg (148 lb)   SpO2 97%   BMI 21.24 kg/m²       Objective  Physical Exam  Constitutional:       General: He is not in acute distress.     Appearance: He is well-developed.   HENT:      Nose: Nose normal.   Eyes:      General: No scleral icterus.     Conjunctiva/sclera: Conjunctivae normal.   Neck:      Thyroid: No thyromegaly.      Trachea: No tracheal deviation.   Cardiovascular:      Rate and Rhythm: Normal rate and regular rhythm.      Heart sounds: No murmur heard.     No friction rub.   Pulmonary:      Effort: No respiratory distress.      Breath sounds: No wheezing or rales.   Abdominal:      General: There is no distension.      Palpations: Abdomen is soft. There is no mass.      Tenderness: There is no abdominal tenderness. There is no guarding.   Musculoskeletal:         General: Deformity present. Normal range of motion.   Lymphadenopathy:      Cervical: No cervical adenopathy.   Skin:     General: Skin is warm and dry.      Findings: No erythema or rash.   Neurological:      Mental Status: He is alert and oriented to person, place, and time.      Cranial Nerves: No cranial nerve deficit.      Coordination: Coordination normal.      Deep Tendon Reflexes: Reflexes are normal and symmetric.   Psychiatric:         Behavior: Behavior normal.         Thought Content: Thought content normal.         Judgment: Judgment normal.       Diagnoses and all orders for this visit:    Palpitation exam unremarkable.auscultation with an occasional skipped beat.  Reviewed prior findings with patient.  He has been reassured for now if symptoms persist may add on a beta-blocker.  He is agreeable with the plan        BMI is within normal parameters. No other follow-up for BMI required.      "

## 2025-04-07 NOTE — TELEPHONE ENCOUNTER
Pt called stating that he was seen in December and had labs done that day as well as x-rays.  He said that he saw his x-ray results on [employer], but hasn't heard anything about his labs.  I looked on [facility] portal and was able to retrieve lab results from 12/17/2024.  They are now in pt's chart. Could you please review labs? Thank you. -TOYA Castillo

## 2025-04-08 ENCOUNTER — RESULTS FOLLOW-UP (OUTPATIENT)
Age: 65
End: 2025-04-08
Payer: MEDICARE

## 2025-04-08 NOTE — TELEPHONE ENCOUNTER
Kasandra said she spoke with pt and gave him his lab results.  She also mailed him a copy of his results per his request. -ANDRY CastilloA

## 2025-04-08 NOTE — TELEPHONE ENCOUNTER
Called and notified pt of lab results per Dr. Salvador. Pt stated understanding. Will mail lab results to Pt per Pt request.

## 2025-08-18 ENCOUNTER — OFFICE VISIT (OUTPATIENT)
Dept: INTERNAL MEDICINE | Facility: CLINIC | Age: 65
End: 2025-08-18
Payer: MEDICARE

## 2025-08-18 VITALS
OXYGEN SATURATION: 100 % | BODY MASS INDEX: 22.51 KG/M2 | TEMPERATURE: 98 F | HEART RATE: 62 BPM | WEIGHT: 152 LBS | DIASTOLIC BLOOD PRESSURE: 72 MMHG | HEIGHT: 69 IN | RESPIRATION RATE: 16 BRPM | SYSTOLIC BLOOD PRESSURE: 136 MMHG

## 2025-08-18 DIAGNOSIS — R00.2 PALPITATIONS: Primary | ICD-10-CM

## 2025-08-18 PROCEDURE — 99397 PER PM REEVAL EST PAT 65+ YR: CPT | Performed by: INTERNAL MEDICINE

## 2025-08-18 PROCEDURE — 1126F AMNT PAIN NOTED NONE PRSNT: CPT | Performed by: INTERNAL MEDICINE

## 2025-08-18 PROCEDURE — 1159F MED LIST DOCD IN RCRD: CPT | Performed by: INTERNAL MEDICINE

## 2025-08-18 PROCEDURE — 1170F FXNL STATUS ASSESSED: CPT | Performed by: INTERNAL MEDICINE

## 2025-08-18 PROCEDURE — G0439 PPPS, SUBSEQ VISIT: HCPCS | Performed by: INTERNAL MEDICINE

## 2025-08-18 PROCEDURE — 1160F RVW MEDS BY RX/DR IN RCRD: CPT | Performed by: INTERNAL MEDICINE

## 2025-08-18 PROCEDURE — 99213 OFFICE O/P EST LOW 20 MIN: CPT | Performed by: INTERNAL MEDICINE

## 2025-08-18 PROCEDURE — G2211 COMPLEX E/M VISIT ADD ON: HCPCS | Performed by: INTERNAL MEDICINE

## (undated) DEVICE — GW INQWIRE FC PTFE STD J/1.5 .035 260

## (undated) DEVICE — TR BAND RADIAL ARTERY COMPRESSION DEVICE: Brand: TR BAND

## (undated) DEVICE — GLIDESHEATH BASIC HYDROPHILIC COATED INTRODUCER SHEATH: Brand: GLIDESHEATH

## (undated) DEVICE — ELECTRD PAD DEFIB A/

## (undated) DEVICE — RADIFOCUS OPTITORQUE ANGIOGRAPHIC CATHETER: Brand: OPTITORQUE